# Patient Record
Sex: MALE | Race: WHITE | NOT HISPANIC OR LATINO | Employment: OTHER | URBAN - METROPOLITAN AREA
[De-identification: names, ages, dates, MRNs, and addresses within clinical notes are randomized per-mention and may not be internally consistent; named-entity substitution may affect disease eponyms.]

---

## 2019-02-10 PROBLEM — K92.2 GI BLEED: Status: ACTIVE | Noted: 2019-02-10

## 2019-02-11 ENCOUNTER — ANESTHESIA (OUTPATIENT)
Dept: ENDOSCOPY | Facility: HOSPITAL | Age: 79
DRG: 374 | End: 2019-02-11
Payer: MEDICARE

## 2019-02-11 ENCOUNTER — ANESTHESIA EVENT (OUTPATIENT)
Dept: ENDOSCOPY | Facility: HOSPITAL | Age: 79
DRG: 374 | End: 2019-02-11
Payer: MEDICARE

## 2019-02-11 ENCOUNTER — HOSPITAL ENCOUNTER (INPATIENT)
Facility: HOSPITAL | Age: 79
LOS: 3 days | Discharge: HOSPICE | DRG: 374 | End: 2019-02-14
Attending: INTERNAL MEDICINE | Admitting: INTERNAL MEDICINE
Payer: MEDICARE

## 2019-02-11 DIAGNOSIS — R00.0 TACHYCARDIA: ICD-10-CM

## 2019-02-11 DIAGNOSIS — I49.9 ABNORMAL HEART RHYTHM: ICD-10-CM

## 2019-02-11 DIAGNOSIS — I48.91 A-FIB: ICD-10-CM

## 2019-02-11 DIAGNOSIS — I25.10 CORONARY ARTERY DISEASE: ICD-10-CM

## 2019-02-11 DIAGNOSIS — R55 SYNCOPE: ICD-10-CM

## 2019-02-11 DIAGNOSIS — K92.2 GI BLEED: ICD-10-CM

## 2019-02-11 DIAGNOSIS — R00.1 BRADYCARDIA: ICD-10-CM

## 2019-02-11 DIAGNOSIS — E87.5 HYPERKALEMIA: ICD-10-CM

## 2019-02-11 DIAGNOSIS — I25.10 CORONARY ARTERY DISEASE, ANGINA PRESENCE UNSPECIFIED, UNSPECIFIED VESSEL OR LESION TYPE, UNSPECIFIED WHETHER NATIVE OR TRANSPLANTED HEART: Primary | ICD-10-CM

## 2019-02-11 PROBLEM — R94.31 QT PROLONGATION: Status: ACTIVE | Noted: 2019-02-11

## 2019-02-11 PROBLEM — M10.9 GOUT: Status: ACTIVE | Noted: 2019-02-11

## 2019-02-11 PROBLEM — I48.0 PAF (PAROXYSMAL ATRIAL FIBRILLATION): Status: ACTIVE | Noted: 2019-02-11

## 2019-02-11 PROBLEM — I10 ESSENTIAL HYPERTENSION: Status: ACTIVE | Noted: 2019-02-11

## 2019-02-11 PROBLEM — Z96.0 RETAINED URETHRAL STENT: Status: ACTIVE | Noted: 2019-02-11

## 2019-02-11 PROBLEM — R91.8 BILATERAL PULMONARY INFILTRATES ON CHEST X-RAY: Status: ACTIVE | Noted: 2019-02-11

## 2019-02-11 LAB
ABO + RH BLD: NORMAL
ALBUMIN SERPL BCP-MCNC: 2.6 G/DL
ALLENS TEST: ABNORMAL
ALP SERPL-CCNC: 98 U/L
ALT SERPL W/O P-5'-P-CCNC: 7 U/L
ANION GAP SERPL CALC-SCNC: 6 MMOL/L
APTT BLDCRRT: 22.7 SEC
ASCENDING AORTA: 3.08 CM
AST SERPL-CCNC: 13 U/L
AV INDEX (PROSTH): 0.56
AV MEAN GRADIENT: 3.18 MMHG
AV PEAK GRADIENT: 5.38 MMHG
AV VALVE AREA: 1.75 CM2
AV VELOCITY RATIO: 0.68
BASOPHILS # BLD AUTO: 0 K/UL
BASOPHILS # BLD AUTO: 0.01 K/UL
BASOPHILS # BLD AUTO: 0.01 K/UL
BASOPHILS # BLD AUTO: 0.02 K/UL
BASOPHILS NFR BLD: 0 %
BASOPHILS NFR BLD: 0.1 %
BASOPHILS NFR BLD: 0.1 %
BASOPHILS NFR BLD: 0.3 %
BILIRUB SERPL-MCNC: 0.7 MG/DL
BILIRUB UR QL STRIP: NEGATIVE
BLD GP AB SCN CELLS X3 SERPL QL: NORMAL
BNP SERPL-MCNC: 155 PG/ML
BSA FOR ECHO PROCEDURE: 1.69 M2
BUN SERPL-MCNC: 38 MG/DL
CALCIUM SERPL-MCNC: 8.7 MG/DL
CHLORIDE SERPL-SCNC: 108 MMOL/L
CLARITY UR REFRACT.AUTO: CLEAR
CO2 SERPL-SCNC: 22 MMOL/L
COLOR UR AUTO: YELLOW
CREAT SERPL-MCNC: 0.9 MG/DL
CV ECHO LV RWT: 0.39 CM
DELSYS: ABNORMAL
DIFFERENTIAL METHOD: ABNORMAL
DOP CALC AO PEAK VEL: 1.16 M/S
DOP CALC AO VTI: 26.67 CM
DOP CALC LVOT AREA: 3.11 CM2
DOP CALC LVOT DIAMETER: 1.99 CM
DOP CALC LVOT PEAK VEL: 0.78 M/S
DOP CALC LVOT STROKE VOLUME: 46.69 CM3
DOP CALCLVOT PEAK VEL VTI: 15.02 CM
E WAVE DECELERATION TIME: 297.66 MSEC
E/A RATIO: 1.23
E/E' RATIO: 14
ECHO LV POSTERIOR WALL: 0.83 CM (ref 0.6–1.1)
EOSINOPHIL # BLD AUTO: 0 K/UL
EOSINOPHIL # BLD AUTO: 0.1 K/UL
EOSINOPHIL NFR BLD: 0 %
EOSINOPHIL NFR BLD: 0.2 %
EOSINOPHIL NFR BLD: 0.6 %
EOSINOPHIL NFR BLD: 1.2 %
ERYTHROCYTE [DISTWIDTH] IN BLOOD BY AUTOMATED COUNT: 15.4 %
ERYTHROCYTE [DISTWIDTH] IN BLOOD BY AUTOMATED COUNT: 15.7 %
ERYTHROCYTE [DISTWIDTH] IN BLOOD BY AUTOMATED COUNT: 16 %
ERYTHROCYTE [DISTWIDTH] IN BLOOD BY AUTOMATED COUNT: 16.2 %
ERYTHROCYTE [SEDIMENTATION RATE] IN BLOOD BY WESTERGREN METHOD: 375 MM/H
EST. GFR  (AFRICAN AMERICAN): >60 ML/MIN/1.73 M^2
EST. GFR  (NON AFRICAN AMERICAN): >60 ML/MIN/1.73 M^2
ESTIMATED AVG GLUCOSE: 100 MG/DL
FIO2: 40
FRACTIONAL SHORTENING: 19 % (ref 28–44)
GLUCOSE SERPL-MCNC: 160 MG/DL
GLUCOSE UR QL STRIP: ABNORMAL
HBA1C MFR BLD HPLC: 5.1 %
HCO3 UR-SCNC: 25.2 MMOL/L (ref 24–28)
HCT VFR BLD AUTO: 24.2 %
HCT VFR BLD AUTO: 25.7 %
HCT VFR BLD AUTO: 25.8 %
HCT VFR BLD AUTO: 26.1 %
HGB BLD-MCNC: 7.6 G/DL
HGB BLD-MCNC: 8.3 G/DL
HGB BLD-MCNC: 8.4 G/DL
HGB BLD-MCNC: 8.5 G/DL
HGB UR QL STRIP: NEGATIVE
IMM GRANULOCYTES # BLD AUTO: 0.02 K/UL
IMM GRANULOCYTES # BLD AUTO: 0.04 K/UL
IMM GRANULOCYTES # BLD AUTO: 0.05 K/UL
IMM GRANULOCYTES # BLD AUTO: 0.05 K/UL
IMM GRANULOCYTES NFR BLD AUTO: 0.3 %
IMM GRANULOCYTES NFR BLD AUTO: 0.6 %
IMM GRANULOCYTES NFR BLD AUTO: 0.7 %
IMM GRANULOCYTES NFR BLD AUTO: 0.7 %
INR PPP: 1
INTERVENTRICULAR SEPTUM: 1.15 CM (ref 0.6–1.1)
KETONES UR QL STRIP: NEGATIVE
LA MAJOR: 6.79 CM
LA MINOR: 6.36 CM
LA WIDTH: 4.13 CM
LACTATE SERPL-SCNC: 1.2 MMOL/L
LEFT ATRIUM SIZE: 4.12 CM
LEFT ATRIUM VOLUME INDEX: 57.6 ML/M2
LEFT ATRIUM VOLUME: 94.99 CM3
LEFT INTERNAL DIMENSION IN SYSTOLE: 3.45 CM (ref 2.1–4)
LEFT VENTRICLE DIASTOLIC VOLUME INDEX: 49.54 ML/M2
LEFT VENTRICLE DIASTOLIC VOLUME: 81.72 ML
LEFT VENTRICLE MASS INDEX: 84.2 G/M2
LEFT VENTRICLE SYSTOLIC VOLUME INDEX: 29.9 ML/M2
LEFT VENTRICLE SYSTOLIC VOLUME: 49.26 ML
LEFT VENTRICULAR INTERNAL DIMENSION IN DIASTOLE: 4.27 CM (ref 3.5–6)
LEFT VENTRICULAR MASS: 138.95 G
LEUKOCYTE ESTERASE UR QL STRIP: NEGATIVE
LIPASE SERPL-CCNC: 9 U/L
LV LATERAL E/E' RATIO: 9.8
LV SEPTAL E/E' RATIO: 24.5
LYMPHOCYTES # BLD AUTO: 0.5 K/UL
LYMPHOCYTES # BLD AUTO: 0.6 K/UL
LYMPHOCYTES # BLD AUTO: 0.7 K/UL
LYMPHOCYTES # BLD AUTO: 0.8 K/UL
LYMPHOCYTES NFR BLD: 10.3 %
LYMPHOCYTES NFR BLD: 6.4 %
LYMPHOCYTES NFR BLD: 9.6 %
LYMPHOCYTES NFR BLD: 9.7 %
MAGNESIUM SERPL-MCNC: 1.7 MG/DL
MCH RBC QN AUTO: 28.6 PG
MCH RBC QN AUTO: 29.8 PG
MCH RBC QN AUTO: 29.9 PG
MCH RBC QN AUTO: 31.1 PG
MCHC RBC AUTO-ENTMCNC: 31.4 G/DL
MCHC RBC AUTO-ENTMCNC: 32.2 G/DL
MCHC RBC AUTO-ENTMCNC: 32.2 G/DL
MCHC RBC AUTO-ENTMCNC: 33.1 G/DL
MCV RBC AUTO: 91 FL
MCV RBC AUTO: 93 FL
MCV RBC AUTO: 93 FL
MCV RBC AUTO: 94 FL
MODE: ABNORMAL
MONOCYTES # BLD AUTO: 0.4 K/UL
MONOCYTES # BLD AUTO: 0.4 K/UL
MONOCYTES # BLD AUTO: 0.5 K/UL
MONOCYTES # BLD AUTO: 0.6 K/UL
MONOCYTES NFR BLD: 5.6 %
MONOCYTES NFR BLD: 6.8 %
MONOCYTES NFR BLD: 7.1 %
MONOCYTES NFR BLD: 7.9 %
MV PEAK A VEL: 0.8 M/S
MV PEAK E VEL: 0.98 M/S
NEUTROPHILS # BLD AUTO: 5 K/UL
NEUTROPHILS # BLD AUTO: 5.6 K/UL
NEUTROPHILS # BLD AUTO: 5.9 K/UL
NEUTROPHILS # BLD AUTO: 6.4 K/UL
NEUTROPHILS NFR BLD: 79.8 %
NEUTROPHILS NFR BLD: 82 %
NEUTROPHILS NFR BLD: 82.8 %
NEUTROPHILS NFR BLD: 87.2 %
NITRITE UR QL STRIP: NEGATIVE
NRBC BLD-RTO: 0 /100 WBC
PCO2 BLDA: 40.7 MMHG (ref 35–45)
PEEP: 5
PH SMN: 7.4 [PH] (ref 7.35–7.45)
PH UR STRIP: 5 [PH] (ref 5–8)
PHOSPHATE SERPL-MCNC: 2.3 MG/DL
PISA TR MAX VEL: 3.38 M/S
PLATELET # BLD AUTO: 143 K/UL
PLATELET # BLD AUTO: 148 K/UL
PLATELET # BLD AUTO: 155 K/UL
PLATELET # BLD AUTO: 165 K/UL
PMV BLD AUTO: 10.4 FL
PMV BLD AUTO: 10.5 FL
PMV BLD AUTO: 10.5 FL
PMV BLD AUTO: 10.9 FL
PO2 BLDA: 198 MMHG (ref 80–100)
POC BE: 0 MMOL/L
POC SATURATED O2: 100 % (ref 95–100)
POC TCO2: 26 MMOL/L (ref 23–27)
POCT GLUCOSE: 113 MG/DL (ref 70–110)
POCT GLUCOSE: 138 MG/DL (ref 70–110)
POCT GLUCOSE: 172 MG/DL (ref 70–110)
POTASSIUM SERPL-SCNC: 5.1 MMOL/L
PROCALCITONIN SERPL IA-MCNC: 0.14 NG/ML
PROT SERPL-MCNC: 5.6 G/DL
PROT UR QL STRIP: NEGATIVE
PROTHROMBIN TIME: 10.4 SEC
RA MAJOR: 5.05 CM
RA WIDTH: 3.88 CM
RBC # BLD AUTO: 2.66 M/UL
RBC # BLD AUTO: 2.73 M/UL
RBC # BLD AUTO: 2.78 M/UL
RBC # BLD AUTO: 2.82 M/UL
RIGHT VENTRICULAR END-DIASTOLIC DIMENSION: 3.85 CM
SAMPLE: ABNORMAL
SINUS: 3.03 CM
SITE: ABNORMAL
SODIUM SERPL-SCNC: 136 MMOL/L
SP GR UR STRIP: 1.02 (ref 1–1.03)
SP02: 100
STJ: 2.94 CM
TDI LATERAL: 0.1
TDI SEPTAL: 0.04
TDI: 0.07
TR MAX PG: 45.7 MMHG
TRICUSPID ANNULAR PLANE SYSTOLIC EXCURSION: 2.41 CM
TROPONIN I SERPL DL<=0.01 NG/ML-MCNC: 0.11 NG/ML
TROPONIN I SERPL DL<=0.01 NG/ML-MCNC: 0.14 NG/ML
TROPONIN I SERPL DL<=0.01 NG/ML-MCNC: 0.15 NG/ML
TSH SERPL DL<=0.005 MIU/L-ACNC: 0.42 UIU/ML
URATE SERPL-MCNC: 3 MG/DL
URN SPEC COLLECT METH UR: ABNORMAL
VT: 16
WBC # BLD AUTO: 6.05 K/UL
WBC # BLD AUTO: 6.77 K/UL
WBC # BLD AUTO: 7.35 K/UL
WBC # BLD AUTO: 7.38 K/UL

## 2019-02-11 PROCEDURE — 83036 HEMOGLOBIN GLYCOSYLATED A1C: CPT

## 2019-02-11 PROCEDURE — 88305 TISSUE SPECIMEN TO PATHOLOGY - SURGERY: ICD-10-PCS | Mod: 26,,, | Performed by: PATHOLOGY

## 2019-02-11 PROCEDURE — 36415 COLL VENOUS BLD VENIPUNCTURE: CPT

## 2019-02-11 PROCEDURE — 99223 1ST HOSP IP/OBS HIGH 75: CPT | Mod: 25,ICN,, | Performed by: INTERNAL MEDICINE

## 2019-02-11 PROCEDURE — 93010 ELECTROCARDIOGRAM REPORT: CPT | Mod: ,,, | Performed by: INTERNAL MEDICINE

## 2019-02-11 PROCEDURE — 63600175 PHARM REV CODE 636 W HCPCS: Performed by: STUDENT IN AN ORGANIZED HEALTH CARE EDUCATION/TRAINING PROGRAM

## 2019-02-11 PROCEDURE — 83690 ASSAY OF LIPASE: CPT

## 2019-02-11 PROCEDURE — 83605 ASSAY OF LACTIC ACID: CPT

## 2019-02-11 PROCEDURE — 85025 COMPLETE CBC W/AUTO DIFF WBC: CPT | Mod: 91

## 2019-02-11 PROCEDURE — 83735 ASSAY OF MAGNESIUM: CPT

## 2019-02-11 PROCEDURE — 84550 ASSAY OF BLOOD/URIC ACID: CPT

## 2019-02-11 PROCEDURE — 88305 TISSUE EXAM BY PATHOLOGIST: CPT | Mod: 26,,, | Performed by: PATHOLOGY

## 2019-02-11 PROCEDURE — 84100 ASSAY OF PHOSPHORUS: CPT

## 2019-02-11 PROCEDURE — 87040 BLOOD CULTURE FOR BACTERIA: CPT

## 2019-02-11 PROCEDURE — 93005 ELECTROCARDIOGRAM TRACING: CPT

## 2019-02-11 PROCEDURE — 81003 URINALYSIS AUTO W/O SCOPE: CPT

## 2019-02-11 PROCEDURE — 37000008 HC ANESTHESIA 1ST 15 MINUTES: Performed by: INTERNAL MEDICINE

## 2019-02-11 PROCEDURE — 20000000 HC ICU ROOM

## 2019-02-11 PROCEDURE — D9220A PRA ANESTHESIA: ICD-10-PCS | Mod: CRNA,,, | Performed by: NURSE ANESTHETIST, CERTIFIED REGISTERED

## 2019-02-11 PROCEDURE — 63600175 PHARM REV CODE 636 W HCPCS: Performed by: INTERNAL MEDICINE

## 2019-02-11 PROCEDURE — 43239 PR EGD, FLEX, W/BIOPSY, SGL/MULTI: ICD-10-PCS | Mod: GC,,, | Performed by: INTERNAL MEDICINE

## 2019-02-11 PROCEDURE — 43239 EGD BIOPSY SINGLE/MULTIPLE: CPT | Performed by: INTERNAL MEDICINE

## 2019-02-11 PROCEDURE — 99223 PR INITIAL HOSPITAL CARE,LEVL III: ICD-10-PCS | Mod: AI,GC,, | Performed by: INTERNAL MEDICINE

## 2019-02-11 PROCEDURE — 83880 ASSAY OF NATRIURETIC PEPTIDE: CPT

## 2019-02-11 PROCEDURE — D9220A PRA ANESTHESIA: Mod: CRNA,,, | Performed by: NURSE ANESTHETIST, CERTIFIED REGISTERED

## 2019-02-11 PROCEDURE — 94003 VENT MGMT INPAT SUBQ DAY: CPT

## 2019-02-11 PROCEDURE — D9220A PRA ANESTHESIA: ICD-10-PCS | Mod: ANES,,, | Performed by: ANESTHESIOLOGY

## 2019-02-11 PROCEDURE — 27200966 HC CLOSED SUCTION SYSTEM

## 2019-02-11 PROCEDURE — 27201012 HC FORCEPS, HOT/COLD, DISP: Performed by: INTERNAL MEDICINE

## 2019-02-11 PROCEDURE — 63600175 PHARM REV CODE 636 W HCPCS: Performed by: ANESTHESIOLOGY

## 2019-02-11 PROCEDURE — 86850 RBC ANTIBODY SCREEN: CPT

## 2019-02-11 PROCEDURE — 94761 N-INVAS EAR/PLS OXIMETRY MLT: CPT

## 2019-02-11 PROCEDURE — 37000009 HC ANESTHESIA EA ADD 15 MINS: Performed by: INTERNAL MEDICINE

## 2019-02-11 PROCEDURE — 84145 PROCALCITONIN (PCT): CPT

## 2019-02-11 PROCEDURE — 25000003 PHARM REV CODE 250: Performed by: INTERNAL MEDICINE

## 2019-02-11 PROCEDURE — 99900035 HC TECH TIME PER 15 MIN (STAT)

## 2019-02-11 PROCEDURE — 27000221 HC OXYGEN, UP TO 24 HOURS

## 2019-02-11 PROCEDURE — 25000003 PHARM REV CODE 250: Performed by: STUDENT IN AN ORGANIZED HEALTH CARE EDUCATION/TRAINING PROGRAM

## 2019-02-11 PROCEDURE — 93010 EKG 12-LEAD: ICD-10-PCS | Mod: 76,,, | Performed by: INTERNAL MEDICINE

## 2019-02-11 PROCEDURE — 94002 VENT MGMT INPAT INIT DAY: CPT

## 2019-02-11 PROCEDURE — 25000003 PHARM REV CODE 250: Performed by: ANESTHESIOLOGY

## 2019-02-11 PROCEDURE — 80053 COMPREHEN METABOLIC PANEL: CPT

## 2019-02-11 PROCEDURE — 84443 ASSAY THYROID STIM HORMONE: CPT

## 2019-02-11 PROCEDURE — 43239 EGD BIOPSY SINGLE/MULTIPLE: CPT | Mod: GC,,, | Performed by: INTERNAL MEDICINE

## 2019-02-11 PROCEDURE — 85730 THROMBOPLASTIN TIME PARTIAL: CPT

## 2019-02-11 PROCEDURE — 99223 PR INITIAL HOSPITAL CARE,LEVL III: ICD-10-PCS | Mod: 25,ICN,, | Performed by: INTERNAL MEDICINE

## 2019-02-11 PROCEDURE — C9113 INJ PANTOPRAZOLE SODIUM, VIA: HCPCS | Performed by: INTERNAL MEDICINE

## 2019-02-11 PROCEDURE — 99223 1ST HOSP IP/OBS HIGH 75: CPT | Mod: AI,GC,, | Performed by: INTERNAL MEDICINE

## 2019-02-11 PROCEDURE — 88305 TISSUE EXAM BY PATHOLOGIST: CPT | Performed by: PATHOLOGY

## 2019-02-11 PROCEDURE — D9220A PRA ANESTHESIA: Mod: ANES,,, | Performed by: ANESTHESIOLOGY

## 2019-02-11 PROCEDURE — 85610 PROTHROMBIN TIME: CPT

## 2019-02-11 PROCEDURE — 93010 ELECTROCARDIOGRAM REPORT: CPT | Mod: 76,,, | Performed by: INTERNAL MEDICINE

## 2019-02-11 PROCEDURE — 84484 ASSAY OF TROPONIN QUANT: CPT

## 2019-02-11 RX ORDER — METRONIDAZOLE 500 MG/100ML
500 INJECTION, SOLUTION INTRAVENOUS
Status: DISCONTINUED | OUTPATIENT
Start: 2019-02-11 | End: 2019-02-11

## 2019-02-11 RX ORDER — CEFEPIME HYDROCHLORIDE 2 G/1
2 INJECTION, POWDER, FOR SOLUTION INTRAVENOUS
Status: DISCONTINUED | OUTPATIENT
Start: 2019-02-11 | End: 2019-02-12

## 2019-02-11 RX ORDER — METOCLOPRAMIDE HYDROCHLORIDE 5 MG/ML
5 INJECTION INTRAMUSCULAR; INTRAVENOUS ONCE
Status: COMPLETED | OUTPATIENT
Start: 2019-02-11 | End: 2019-02-11

## 2019-02-11 RX ORDER — ONDANSETRON 8 MG/1
8 TABLET, ORALLY DISINTEGRATING ORAL EVERY 8 HOURS PRN
Status: DISCONTINUED | OUTPATIENT
Start: 2019-02-11 | End: 2019-02-11

## 2019-02-11 RX ORDER — SUCCINYLCHOLINE CHLORIDE 20 MG/ML
INJECTION INTRAMUSCULAR; INTRAVENOUS
Status: DISCONTINUED | OUTPATIENT
Start: 2019-02-11 | End: 2019-02-11

## 2019-02-11 RX ORDER — INSULIN ASPART 100 [IU]/ML
0-5 INJECTION, SOLUTION INTRAVENOUS; SUBCUTANEOUS EVERY 6 HOURS PRN
Status: DISCONTINUED | OUTPATIENT
Start: 2019-02-11 | End: 2019-02-14 | Stop reason: HOSPADM

## 2019-02-11 RX ORDER — ACETAMINOPHEN 325 MG/1
650 TABLET ORAL EVERY 4 HOURS PRN
Status: DISCONTINUED | OUTPATIENT
Start: 2019-02-11 | End: 2019-02-14 | Stop reason: HOSPADM

## 2019-02-11 RX ORDER — FENTANYL CITRATE 50 UG/ML
50 INJECTION, SOLUTION INTRAMUSCULAR; INTRAVENOUS
Status: DISCONTINUED | OUTPATIENT
Start: 2019-02-15 | End: 2019-02-12

## 2019-02-11 RX ORDER — POTASSIUM CHLORIDE 20 MEQ/15ML
40 SOLUTION ORAL
Status: DISCONTINUED | OUTPATIENT
Start: 2019-02-11 | End: 2019-02-11

## 2019-02-11 RX ORDER — PHENYLEPHRINE HYDROCHLORIDE 10 MG/ML
INJECTION INTRAVENOUS
Status: DISCONTINUED | OUTPATIENT
Start: 2019-02-11 | End: 2019-02-11

## 2019-02-11 RX ORDER — FENTANYL CITRATE 50 UG/ML
50 INJECTION, SOLUTION INTRAMUSCULAR; INTRAVENOUS
Status: DISCONTINUED | OUTPATIENT
Start: 2019-02-11 | End: 2019-02-12

## 2019-02-11 RX ORDER — SODIUM CHLORIDE 0.9 % (FLUSH) 0.9 %
3 SYRINGE (ML) INJECTION
Status: DISCONTINUED | OUTPATIENT
Start: 2019-02-11 | End: 2019-02-14 | Stop reason: HOSPADM

## 2019-02-11 RX ORDER — PROPOFOL 10 MG/ML
VIAL (ML) INTRAVENOUS
Status: DISCONTINUED | OUTPATIENT
Start: 2019-02-11 | End: 2019-02-11

## 2019-02-11 RX ORDER — MAGNESIUM SULFATE HEPTAHYDRATE 40 MG/ML
2 INJECTION, SOLUTION INTRAVENOUS
Status: DISCONTINUED | OUTPATIENT
Start: 2019-02-11 | End: 2019-02-11

## 2019-02-11 RX ORDER — GLUCAGON 1 MG
1 KIT INJECTION
Status: DISCONTINUED | OUTPATIENT
Start: 2019-02-11 | End: 2019-02-14 | Stop reason: HOSPADM

## 2019-02-11 RX ORDER — CEFEPIME HYDROCHLORIDE 2 G/1
2 INJECTION, POWDER, FOR SOLUTION INTRAVENOUS
Status: DISCONTINUED | OUTPATIENT
Start: 2019-02-11 | End: 2019-02-11

## 2019-02-11 RX ORDER — PANTOPRAZOLE SODIUM 40 MG/10ML
40 INJECTION, POWDER, LYOPHILIZED, FOR SOLUTION INTRAVENOUS 2 TIMES DAILY
Status: DISCONTINUED | OUTPATIENT
Start: 2019-02-11 | End: 2019-02-13

## 2019-02-11 RX ORDER — NOREPINEPHRINE BITARTRATE/D5W 4MG/250ML
0.02 PLASTIC BAG, INJECTION (ML) INTRAVENOUS CONTINUOUS
Status: DISCONTINUED | OUTPATIENT
Start: 2019-02-11 | End: 2019-02-12

## 2019-02-11 RX ORDER — PROMETHAZINE HYDROCHLORIDE 12.5 MG/1
12.5 TABLET ORAL EVERY 6 HOURS PRN
Status: DISCONTINUED | OUTPATIENT
Start: 2019-02-11 | End: 2019-02-14 | Stop reason: HOSPADM

## 2019-02-11 RX ORDER — EPHEDRINE SULFATE/0.9% NACL/PF 25 MG/5 ML
10 SYRINGE (ML) INTRAVENOUS ONCE
Status: DISCONTINUED | OUTPATIENT
Start: 2019-02-11 | End: 2019-02-11

## 2019-02-11 RX ORDER — EPINEPHRINE 1 MG/ML
INJECTION INTRAMUSCULAR; INTRAVENOUS; SUBCUTANEOUS
Status: DISCONTINUED
Start: 2019-02-11 | End: 2019-02-11 | Stop reason: WASHOUT

## 2019-02-11 RX ORDER — FENTANYL CITRATE 50 UG/ML
INJECTION, SOLUTION INTRAMUSCULAR; INTRAVENOUS
Status: DISCONTINUED | OUTPATIENT
Start: 2019-02-11 | End: 2019-02-11

## 2019-02-11 RX ORDER — PROPOFOL 10 MG/ML
5 INJECTION, EMULSION INTRAVENOUS CONTINUOUS
Status: DISCONTINUED | OUTPATIENT
Start: 2019-02-11 | End: 2019-02-12

## 2019-02-11 RX ORDER — ETOMIDATE 2 MG/ML
INJECTION INTRAVENOUS
Status: DISCONTINUED | OUTPATIENT
Start: 2019-02-11 | End: 2019-02-11

## 2019-02-11 RX ADMIN — CEFEPIME 2 G: 2 INJECTION, POWDER, FOR SOLUTION INTRAVENOUS at 09:02

## 2019-02-11 RX ADMIN — PROPOFOL 25 MG: 10 INJECTION, EMULSION INTRAVENOUS at 02:02

## 2019-02-11 RX ADMIN — PHENYLEPHRINE HYDROCHLORIDE 50 MCG: 10 INJECTION INTRAVENOUS at 02:02

## 2019-02-11 RX ADMIN — FENTANYL CITRATE 50 MCG: 50 INJECTION, SOLUTION INTRAMUSCULAR; INTRAVENOUS at 03:02

## 2019-02-11 RX ADMIN — PANTOPRAZOLE SODIUM 40 MG: 40 INJECTION, POWDER, FOR SOLUTION INTRAVENOUS at 09:02

## 2019-02-11 RX ADMIN — PANTOPRAZOLE SODIUM 40 MG: 40 INJECTION, POWDER, FOR SOLUTION INTRAVENOUS at 02:02

## 2019-02-11 RX ADMIN — Medication 0.02 MCG/KG/MIN: at 05:02

## 2019-02-11 RX ADMIN — PROPOFOL 5 MCG/KG/MIN: 10 INJECTION, EMULSION INTRAVENOUS at 03:02

## 2019-02-11 RX ADMIN — ETOMIDATE 20 MG: 2 INJECTION, SOLUTION INTRAVENOUS at 02:02

## 2019-02-11 RX ADMIN — PHENYLEPHRINE HYDROCHLORIDE 100 MCG: 10 INJECTION INTRAVENOUS at 02:02

## 2019-02-11 RX ADMIN — SUCCINYLCHOLINE CHLORIDE 100 MG: 20 INJECTION, SOLUTION INTRAMUSCULAR; INTRAVENOUS at 02:02

## 2019-02-11 RX ADMIN — PROPOFOL 50 MG: 10 INJECTION, EMULSION INTRAVENOUS at 02:02

## 2019-02-11 RX ADMIN — SODIUM CHLORIDE 500 ML: 0.9 INJECTION, SOLUTION INTRAVENOUS at 03:02

## 2019-02-11 RX ADMIN — ETOMIDATE 10 MG: 2 INJECTION, SOLUTION INTRAVENOUS at 02:02

## 2019-02-11 RX ADMIN — METOCLOPRAMIDE 5 MG: 5 INJECTION, SOLUTION INTRAMUSCULAR; INTRAVENOUS at 06:02

## 2019-02-11 RX ADMIN — MAGNESIUM SULFATE IN WATER 2 G: 40 INJECTION, SOLUTION INTRAVENOUS at 03:02

## 2019-02-11 RX ADMIN — FENTANYL CITRATE 25 MCG: 50 INJECTION, SOLUTION INTRAMUSCULAR; INTRAVENOUS at 02:02

## 2019-02-11 NOTE — ANESTHESIA POSTPROCEDURE EVALUATION
"Anesthesia Post Evaluation    Patient: Cash Rivera    Procedure(s) Performed: Procedure(s) (LRB):  EGD (ESOPHAGOGASTRODUODENOSCOPY) (N/A)    Final Anesthesia Type: general  Patient location during evaluation: PACU  Patient participation: No - Unable to Participate, Intubation  Level of consciousness: sedated  Post-procedure vital signs: reviewed and stable  Pain management: adequate  Airway patency: patent  PONV status at discharge: No PONV  Anesthetic complications: no      Cardiovascular status: hypotensive and hemodynamically stable  Respiratory status: intubated  Hydration status: euvolemic  Follow-up not needed.    Spoke with critical care team about patient needing additional sedation and some hemodynamic support as BP was 80s/50s when sign out was given.     Visit Vitals  /60 (BP Location: Left arm, Patient Position: Lying)   Pulse 63   Temp 36.6 °C (97.8 °F) (Oral)   Resp 19   Ht 5' 7" (1.702 m)   Wt 60.7 kg (133 lb 13.1 oz)   SpO2 99%   BMI 20.96 kg/m²       Pain/Jessica Score: No Data Recorded      "

## 2019-02-11 NOTE — PROGRESS NOTES
Pt has been stable for shift. No hematemesis or hematochezia EGD performed at bedside. Heart rate bradycardic. Pt left intubated to secure airway and complete Reglan therapy. Tissue biopsied. Propofol and Fentanyl ordered for sedation. Levophed initiated for MAP goal >65 per MD.

## 2019-02-11 NOTE — PLAN OF CARE
Problem: Adult Inpatient Plan of Care  Goal: Patient-Specific Goal (Individualization)          2/11:    Transferred from OSH for syncope, GI bleed. (2units PRBS, 1 unit FFP given at OSH)               GI consulted for possible EGD.               EGD performed. Large ulcer biopsied for possible carcinoma. Pt. Intubated and sedated for 24 hr Reglan therapy.              Propfol and Levo started.

## 2019-02-11 NOTE — HPI
78 year old male with a history of HTN, Goat, CAD s/p remote stent on who GI is being consulted for a suspected GI bleed in the setting of hematemesis with Hgb trending down.    History obtained from son and chart review.  Patient went to the [facility] on Saturday. He did not feel well as the event progress and eventually had a syncopal episode. He was then taken to an OSH where per family was started on IVF, Levaquin, and Prednisone. Yesterday team was working on possible D/C however in the afternoon son received a call stating that he had an episode of hematemesis. At that time patient had an NGT placed and was also started on a PPI. There is also mention of patient being on Levophed and Amiodarone.     Patient does have a history of indigestion with PPI as needed and currently using NSAID for epigastric pain (Advil/Alkaseltzer for abdominal pain).  No prior EGD or colonoscopy that the son knows about.    At [facility] patient is in the ICU but hemodynamically stable and not on pressors. He has not had further episodes of hematemesis or melena. Hemoglobin is down to ~7 from ~ 11 on 2/9/19.

## 2019-02-11 NOTE — SUBJECTIVE & OBJECTIVE
No past medical history on file.    No past surgical history on file.    Review of patient's allergies indicates:   Allergen Reactions    Penicillins        Family History     None        Tobacco Use    Smoking status: Not on file   Substance and Sexual Activity    Alcohol use: Not on file    Drug use: Not on file    Sexual activity: Not on file      Review of Systems   Unable to perform ROS: Dementia   Constitutional: Positive for fatigue. Negative for chills, diaphoresis and fever.   HENT: Negative for ear discharge.    Eyes: Negative for visual disturbance.   Respiratory: Positive for shortness of breath. Negative for cough, choking, chest tightness, wheezing and stridor.    Cardiovascular: Negative for chest pain, palpitations and leg swelling.   Gastrointestinal: Positive for abdominal pain, nausea and vomiting. Negative for abdominal distention, anal bleeding, blood in stool, constipation and diarrhea.   Endocrine: Negative for polydipsia, polyphagia and polyuria.   Genitourinary: Negative for difficulty urinating, genital sores, hematuria, penile swelling and urgency.   Musculoskeletal: Negative for arthralgias.   Skin: Positive for pallor. Negative for wound.   Allergic/Immunologic: Negative for food allergies and immunocompromised state.   Neurological: Positive for syncope and weakness. Negative for speech difficulty, numbness and headaches.   Psychiatric/Behavioral: Negative for agitation and behavioral problems.     Objective:     Vital Signs (Most Recent):  Pulse: (!) 56 (02/11/19 0145)  Resp: (!) 26 (02/11/19 0145)  BP: (!) 99/53 (02/11/19 0145)  SpO2: (!) 93 % (02/11/19 0145) Vital Signs (24h Range):  Pulse:  [] 56  Resp:  [23-26] 26  SpO2:  [93 %-97 %] 93 %  BP: ()/(43-68) 99/53      There is no height or weight on file to calculate BMI.    No intake or output data in the 24 hours ending 02/11/19 0243    Physical Exam   Constitutional: He is oriented to person, place, and time. No  distress.   Cachectic    HENT:   Mouth/Throat: No oropharyngeal exudate.   Edentulous, pale conjunctiva   Eyes: EOM are normal. Pupils are equal, round, and reactive to light. Right eye exhibits no discharge. Left eye exhibits no discharge. No scleral icterus.   Neck: Normal range of motion. No JVD present. No tracheal deviation present.   Cardiovascular: Regular rhythm, normal heart sounds and intact distal pulses. Exam reveals no gallop and no friction rub.   No murmur heard.  Sinus nica 52 bpm   Pulmonary/Chest: Effort normal. No respiratory distress. He has no wheezes. He has rales. He exhibits no tenderness.   Abdominal: Soft. Bowel sounds are normal. He exhibits no distension and no mass. There is tenderness. There is no rebound and no guarding.   Scaphoid abdomen   Musculoskeletal: Normal range of motion. 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 He exhibits no edema or tenderness.   Neurological: He is alert and oriented to person, place, and time. No cranial nerve deficit. Coordination normal.   Skin: Skin is warm. Capillary refill takes less than 2 seconds. No rash noted. He is not diaphoretic. No erythema. No pallor.   Psychiatric: He has a normal mood and affect.       Vents:     Lines/Drains/Airways          None        Significant Labs:    CBC/Anemia Profile:  Recent Labs   Lab 02/11/19  0145   WBC 7.38   HGB 8.4*   HCT 26.1*      MCV 93   RDW 15.4*        Chemistries:  No results for input(s): NA, K, CL, CO2, BUN, CREATININE, CALCIUM, ALBUMIN, PROT, BILITOT, ALKPHOS, ALT, AST, GLUCOSE, MG, PHOS in the last 48 hours.    CMP: No results for input(s): NA, K, CL, CO2, GLU, BUN, CREATININE, CALCIUM, PROT, ALBUMIN, BILITOT, ALKPHOS, AST, ALT, ANIONGAP, EGFRNONAA in the last 48 hours.    Invalid input(s): ESTGFAFRICA  Cardiac Markers: No results for input(s): CKMB, TROPONINT, MYOGLOBIN in the last 48 hours.  All pertinent labs within the past 24 hours have been reviewed.    Significant  Imaging: I have reviewed and interpreted all pertinent imaging results/findings within the past 24 hours.  Bibasilar interstitial airspace disease.

## 2019-02-11 NOTE — TRANSFER OF CARE
"Anesthesia Transfer of Care Note    Patient: Cash Rivera    Procedure(s) Performed: Procedure(s) (LRB):  EGD (ESOPHAGOGASTRODUODENOSCOPY) (N/A)    Patient location: ICU    Anesthesia Type: general    Post pain: adequate analgesia    Post assessment: no apparent anesthetic complications and tolerated procedure well    Post vital signs: stable    Level of consciousness: awake    Nausea/Vomiting: no nausea/vomiting    Complications: none    Transfer of care protocol was followedComments: Report given to ICU nurse. Pt to remain intubated in the ICU. CC team called by Dr Ruth.      Last vitals:   Visit Vitals  /60 (BP Location: Left arm, Patient Position: Lying)   Pulse 63   Temp 36.6 °C (97.8 °F) (Oral)   Resp 19   Ht 5' 7" (1.702 m)   Wt 60.7 kg (133 lb 13.1 oz)   SpO2 99%   BMI 20.96 kg/m²     "

## 2019-02-11 NOTE — ASSESSMENT & PLAN NOTE
-patient arrived on amiodarone gtt, will hold for now. In Sinus bradycardia on arrival. He was requiring amiodarone for Neosyn and Levophed- induced AF

## 2019-02-11 NOTE — ANESTHESIA PREPROCEDURE EVALUATION
02/11/2019  [facility]  Anesthesia Pre-Operative Evaluation         Patient Name: Cash Rivera  YOB: 1940  MRN: [id number]    SUBJECTIVE:     Pre-operative evaluation for Procedure(s) (LRB):  EGD (ESOPHAGOGASTRODUODENOSCOPY) (N/A)     02/11/2019    Cash Rivera is a 78 y.o. male w/ a significant PMHx of HTN, CAD s/p remote stent placement, pAFib, prolonged QT,  here with hematemesis and concerns for upper GIB. CXR with Diffuse abnormal interstitial alveolar pattern. Sats mid to upper 90s on RA.     Patient now presents for the above procedure(s).      LDA:       Peripheral IV - Single Lumen 02/11/19 0228 Left Antecubital (Active)   Site Assessment Clean;Dry;Intact;No redness;No swelling 2/11/2019  2:00 AM   Line Status Flushed;Saline locked 2/11/2019  2:00 AM   Dressing Status Clean;Dry;Intact 2/11/2019  2:00 AM   Number of days: 0            Peripheral IV - Single Lumen 02/11/19 0229 Left Forearm (Active)   Site Assessment Clean;Dry;Intact;No redness;No swelling 2/11/2019  2:00 AM   Line Status Flushed;Saline locked 2/11/2019  2:00 AM   Dressing Status Clean;Dry;Intact 2/11/2019  2:00 AM   Number of days: 0            NG/OG Tube 02/11/19 0235 (Active)   Placement Check placement verified by aspirate characteristics;placement verified by x-ray 2/11/2019  2:00 AM   Tolerance no signs/symptoms of discomfort 2/11/2019  2:00 AM   Securement secured to forehead w/ adhesive device 2/11/2019  2:00 AM   Clamp Status/Tolerance unclamped 2/11/2019  2:00 AM   Suction Setting/Drainage Method low;intermittent setting;suction at 2/11/2019  2:00 AM   Insertion Site Appearance no redness, warmth, tenderness, skin breakdown, drainage 2/11/2019  2:00 AM   Drainage Bloody 2/11/2019  2:00 AM   Tube Output(mL)(Include Discarded Residual) 10 mL 2/11/2019  6:00 AM   Number of days: 0        Prev airway: None documented.    Drips: None documented.      Patient Active Problem List   Diagnosis    GI bleed    CAD (coronary artery disease)    Gout    Syncope    Essential hypertension    Bilateral pulmonary infiltrates on chest x-ray    QT prolongation    PAF (paroxysmal atrial fibrillation)    Retained urethral stent       Review of patient's allergies indicates:   Allergen Reactions    Penicillins Hives       Current Inpatient Medications:   ceFEPime (MAXIPIME) IVPB  2 g Intravenous Q12H    pantoprazole  40 mg Intravenous BID       No current facility-administered medications on file prior to encounter.      No current outpatient medications on file prior to encounter.       Past Surgical History:   Procedure Laterality Date    ARM AMPUTATION AT ELBOW Right        Social History     Socioeconomic History    Marital status:      Spouse name: Not on file    Number of children: Not on file    Years of education: Not on file    Highest education level: Not on file   Social Needs    Financial resource strain: Not on file    Food insecurity - worry: Not on file    Food insecurity - inability: Not on file    Transportation needs - medical: Not on file    Transportation needs - non-medical: Not on file   Occupational History    Not on file   Tobacco Use    Smoking status: Not on file    Smokeless tobacco: Current User     Types: Chew   Substance and Sexual Activity    Alcohol use: No     Frequency: Never    Drug use: No    Sexual activity: Not Currently   Other Topics Concern    Not on file   Social History Narrative    Not on file       OBJECTIVE:     Vital Signs Range (Last 24H):  Temp:  [36.7 °C (98.1 °F)]   Pulse:  []   Resp:  [13-31]   BP: ()/(43-68)   SpO2:  [93 %-98 %]       Significant Labs:  Lab Results   Component Value Date    WBC 6.77 02/11/2019    HGB 8.3 (L) 02/11/2019    HCT 25.8 (L) 02/11/2019    MCV 93 02/11/2019     02/11/2019        Chemistry        Component Value Date/Time     02/11/2019 0145    K 5.1 02/11/2019 0145     02/11/2019 0145    CO2 22 (L) 02/11/2019 0145    BUN 38 (H) 02/11/2019 0145    CREATININE 0.9 02/11/2019 0145     (H) 02/11/2019 0145        Component Value Date/Time    CALCIUM 8.7 02/11/2019 0145    ALKPHOS 98 02/11/2019 0145    AST 13 02/11/2019 0145    ALT 7 (L) 02/11/2019 0145    BILITOT 0.7 02/11/2019 0145    ESTGFRAFRICA >60.0 02/11/2019 0145    EGFRNONAA >60.0 02/11/2019 0145        Lab Results   Component Value Date    INR 1.0 02/11/2019       Diagnostic Studies:       CXR   Impression       Diffuse abnormal interstitial alveolar pattern.  Bilateral pneumonitis, cardiogenic or noncardiogenic edema, sepsis, aspiration and ARDS all need to be considered.    NG tube near the EG junction.         EKG: No recent studies available. In process.     2D ECHO:  No results found for this or any previous visit.    TTE pending.       ASSESSMENT/PLAN:         Anesthesia Evaluation    I have reviewed the Patient Summary Reports.     I have reviewed the Medications.     Review of Systems  Anesthesia Hx:  Hx of Anesthetic complications  History of prior surgery of interest to airway management or planning: Denies Family Hx of Anesthesia complications.   Denies Personal Hx of Anesthesia complications.   Social:  Non-Smoker    Hematology/Oncology:     Oncology Normal    -- Anemia:   EENT/Dental:EENT/Dental Normal   Cardiovascular:   Hypertension CAD   Sinus bradycardia  Lateral infarct ,age undetermined  Abnormal ECG  No previous ECGs available   Pulmonary:  Pulmonary Normal    Renal/:  Renal/ Normal     Hepatic/GI:   GI bleed   Musculoskeletal:  Musculoskeletal Normal    Neurological:  Neurology Normal    Endocrine:  Endocrine Normal    Dermatological:  Skin Normal    Psych:  Psychiatric Normal           Physical Exam  General:  Well nourished    Airway/Jaw/Neck:  Airway Findings: Mouth Opening: Normal Tongue:  Normal  General Airway Assessment: Adult  Mallampati: II  Improves to II with phonation.  TM Distance: Normal, at least 6 cm  Jaw/Neck Findings:  Neck ROM: Normal ROM     Eyes/Ears/Nose:  Eyes/Ears/Nose Findings: (NG tube)    Dental:  Dental Findings: Edentulous   Chest/Lungs:  Chest/Lungs Findings: Clear to auscultation, Normal Respiratory Rate     Heart/Vascular:  Heart Findings: Rate: Normal      Musculoskeletal:  Musculoskeletal Findings: (s/p RUE amputation) Amputation     Mental Status:  Mental Status Findings: (Oriented to self)  Cooperative         Anesthesia Plan  Type of Anesthesia, risks & benefits discussed:  Anesthesia Type:  general  Patient's Preference:   Intra-op Monitoring Plan: standard ASA monitors  Intra-op Monitoring Plan Comments:   Post Op Pain Control Plan: multimodal analgesia  Post Op Pain Control Plan Comments:   Induction:   IV  Beta Blocker:  Patient is not currently on a Beta-Blocker (No further documentation required).       Informed Consent: Patient representative understands risks and agrees with Anesthesia plan.  Questions answered. Anesthesia consent signed with patient representative.  ASA Score: 3  emergent   Day of Surgery Review of History & Physical:    H&P update referred to the provider.         Ready For Surgery From Anesthesia Perspective.

## 2019-02-11 NOTE — ASSESSMENT & PLAN NOTE
Etiology symptomatic anemia vs cardiogenic - reviewed EKG- AF RVR noted at outside hospital. Rate control if in AF to keep rate < 115, keep K > 4, Mg >2, cardiac monitoring. Cannot tolerate anticoagulation at this time with GIB.

## 2019-02-11 NOTE — H&P
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 Patient reports that his initial symptoms included syncope and "seeing black" while he was using chewing tobacco on 2/9/18. The event was witnessed by a family member who noted about 1 minute of turning his head to the left and shaking all over" per ED reports. At that time he also reports mild chest discomfort to include substernal CP and dysnpea that have lasted subacutely for 3-4 weeks. His initial Hb was 11.4 on 2/9, and during his stay at [facility], he developed hematemesis, symptomatic anemia and had a possible aspiration event and 2 point drop in Hb. He was placed on nasogastric suction with coffee ground aspirate, and was given PPI therapy and transferred to [facility] for GI evaluation with Neosyn at the OSH, then Levophed and amiodarone infusing for AF RVR. Upon arrival the patient had stable vital signs, sinus bradycardia, MAP of 70. He reports taking intermittent NSAIDs and Judith Bono for stomach pain. He reports only taking ASA81 for CAD given remote revascularization history with 4 stents per charting. He was being treated for aspiration pneumonia vs bibasilar infiltrates with Levaquin at the time of transfer, and was scheduled for outside hospital discharge until hematemesis developed. He is a DNR code status.     Hospital/ICU Course:  No notes on file     No past medical history on " file.    No past surgical history on file.    Review of patient's allergies indicates:   Allergen Reactions    Penicillins        Family History     None        Tobacco Use    Smoking status: Not on file   Substance and Sexual Activity    Alcohol use: Not on file    Drug use: Not on file    Sexual activity: Not on file      Review of Systems   Unable to perform ROS: Dementia   Constitutional: Positive for fatigue. Negative for chills, diaphoresis and fever.   HENT: Negative for ear discharge.    Eyes: Negative for visual disturbance.   Respiratory: Positive for shortness of breath. Negative for cough, choking, chest tightness, wheezing and stridor.    Cardiovascular: Negative for chest pain, palpitations and leg swelling.   Gastrointestinal: Positive for abdominal pain, nausea and vomiting. Negative for abdominal distention, anal bleeding, blood in stool, constipation and diarrhea.   Endocrine: Negative for polydipsia, polyphagia and polyuria.   Genitourinary: Negative for difficulty urinating, genital sores, hematuria, penile swelling and urgency.   Musculoskeletal: Negative for arthralgias.   Skin: Positive for pallor. Negative for wound.   Allergic/Immunologic: Negative for food allergies and immunocompromised state.   Neurological: Positive for syncope and weakness. Negative for speech difficulty, numbness and headaches.   Psychiatric/Behavioral: Negative for agitation and behavioral problems.     Objective:     Vital Signs (Most Recent):  Pulse: (!) 56 (02/11/19 0145)  Resp: (!) 26 (02/11/19 0145)  BP: (!) 99/53 (02/11/19 0145)  SpO2: (!) 93 % (02/11/19 0145) Vital Signs (24h Range):  Pulse:  [] 56  Resp:  [23-26] 26  SpO2:  [93 %-97 %] 93 %  BP: ()/(43-68) 99/53      There is no height or weight on file to calculate BMI.    No intake or output data in the 24 hours ending 02/11/19 0243    Physical Exam   Constitutional: He is oriented to person, place, and time. No distress.   Cachectic     HENT:   Mouth/Throat: No oropharyngeal exudate.   Edentulous, pale conjunctiva   Eyes: EOM are normal. Pupils are equal, round, and reactive to light. Right eye exhibits no discharge. Left eye exhibits no discharge. No scleral icterus.   Neck: Normal range of motion. No JVD present. No tracheal deviation present.   Cardiovascular: Regular rhythm, normal heart sounds and intact distal pulses. Exam reveals no gallop and no friction rub.   No murmur heard.  Sinus nica 52 bpm   Pulmonary/Chest: Effort normal. No respiratory distress. He has no wheezes. He has rales. He exhibits no tenderness.   Abdominal: Soft. Bowel sounds are normal. He exhibits no distension and no mass. There is tenderness. There is no rebound and no guarding.   Scaphoid abdomen   Musculoskeletal: Normal range of motion. 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 He exhibits no edema or tenderness.   Neurological: He is alert and oriented to person, place, and time. No cranial nerve deficit. Coordination normal.   Skin: Skin is warm. Capillary refill takes less than 2 seconds. No rash noted. He is not diaphoretic. No erythema. No pallor.   Psychiatric: He has a normal mood and affect.       Vents:     Lines/Drains/Airways          None        Significant Labs:    CBC/Anemia Profile:  Recent Labs   Lab 02/11/19  0145   WBC 7.38   HGB 8.4*   HCT 26.1*      MCV 93   RDW 15.4*        Chemistries:  No results for input(s): NA, K, CL, CO2, BUN, CREATININE, CALCIUM, ALBUMIN, PROT, BILITOT, ALKPHOS, ALT, AST, GLUCOSE, MG, PHOS in the last 48 hours.    CMP: No results for input(s): NA, K, CL, CO2, GLU, BUN, CREATININE, CALCIUM, PROT, ALBUMIN, BILITOT, ALKPHOS, AST, ALT, ANIONGAP, EGFRNONAA in the last 48 hours.    Invalid input(s): ESTGFAFRICA  Cardiac Markers: No results for input(s): CKMB, TROPONINT, MYOGLOBIN in the last 48 hours.  All pertinent labs within the past 24 hours have been reviewed.    Significant Imaging: I have reviewed  and interpreted all pertinent imaging results/findings within the past 24 hours.  Bibasilar interstitial airspace disease.     Assessment/Plan:     Pulmonary   Bilateral pulmonary infiltrates on chest x-ray    -etiology possibly chronic parenchymal lung disease vs new aspiration event- cover with cefepime and Flagyl for now.      Cardiac/Vascular   PAF (paroxysmal atrial fibrillation)    -patient arrived on amiodarone gtt, will hold for now. In Sinus bradycardia on arrival. He was requiring amiodarone for Neosyn and Levophed- induced AF     Essential hypertension    -on atenolol at home, hold BB in GIB. Has required pressors- resume Levophed if needed for titration to MAP goal 65     CAD (coronary artery disease)    -remote history of stenting, on ASA81, atorvastatin, atenolol at home, SLNTG PRN. Hold home regimen for GI endoscopy, advance back to GDMT when stable.   -check EKG, troponin, no current angina concern for ACS. Avoid FQ antibiotics and Zofran given QTc  -will consult Cardiology as warranted for symptoms of ACS if present     QT prolongation    -avoid FQ antibiotics (received Levaquin prior to arrival ) and Zofran.      GI   * GI bleed    DDX PUD vs AVM vs variceal bleed (less likely).   -has received PPI load in [place], continue PPI 40mg IV BID, GI consulted, maintain 2 large bore PIVs, fluid support as tolerated per CXR and rales on exam.   -arrived with NGT in,. Trend CBC q6h for now.   NPO, anticipate EGD with GI. Has been lung on coverage for aspiration PNA vs parenchymal lung disease w/ Levaquin/steroids, here will continue with cefepime Flagyl, will also cover for variceal risk given little known history.      Orthopedic   Gout    -on allopurinol daily, resume when GIB stable.      Other   Syncope    Etiology symptomatic anemia vs cardiogenic - reviewed EKG- AF RVR noted at outside hospital. Rate control if in AF to keep rate < 115, keep K > 4, Mg >2, cardiac monitoring. Cannot tolerate  anticoagulation at this time with GIB.          Critical Care Daily Checklist:    A: Awake: RASS Goal/Actual Goal:    Actual:     B: Spontaneous Breathing Trial Performed?     C: SAT & SBT Coordinated?                       D: Delirium: CAM-ICU     E: Early Mobility Performed? no   F: Feeding Goal:    Status:     Current Diet Order   Procedures    Diet NPO Except for: Medication, Sips with Medication     Order Specific Question:   Except for     Answer:   Medication     Order Specific Question:   Except for     Answer:   Sips with Medication      AS: Analgesia/Sedation no   T: Thromboembolic Prophylaxis mech    H: HOB > 300 yes   U: Stress Ulcer Prophylaxis (if needed) PPI IV    G: Glucose Control 0-5SSI   B: Bowel Function     I: Indwelling Catheter (Lines & Rodarte) Necessity Condom cath   D: De-escalation of Antimicrobials/Pharmacotherapies Cefepime/Flagyl IV     Plan for the day/ETD EGD     Code Status:  Family/Goals of Care: DNR         Critical secondary to Patient has a condition that poses threat to life and bodily function: acute upper GI bleed     Critical care was time spent personally by me on the following activities: development of treatment plan with patient or surrogate and bedside caregivers, discussions with consultants, evaluation of patient's response to treatment, examination of patient, ordering and performing treatments and interventions, ordering and review of laboratory studies, ordering and review of radiographic studies, pulse oximetry, re-evaluation of patient's condition. This critical care time did not overlap with that of any other provider or involve time for any procedures.     Galo Byers MD  Critical Care Medicine  [facility]

## 2019-02-11 NOTE — ASSESSMENT & PLAN NOTE
DDX PUD vs AVM vs varix (less likely).   -has received PPI load in [place], continue PPI 40mg IV BID, GI consulted, maintain 2 large bore PIVs, fluid support as tolerated per CXR and rales on exam.   -arrived with NGT in, continue LIMWS. Trend CBC q6h for now.   NPO, anticipate EGD with GI. Has been lung coverage for aspiration PNA with cefepime Flagyl, will cover for variceal risk given little known history.

## 2019-02-11 NOTE — PLAN OF CARE
Problem: Adult Inpatient Plan of Care  Goal: Patient-Specific Goal (Individualization)  2/11/19 SICU admit from [facility]  HX: cad, htn, stents x 4, asprin, gout, urethral stents  Dx: GI bleed, syncope   2/9 ed admit [facility] for syncopal episode   2/11 icu upgrade from the floor after syncopal episode w/bloody emisis, possible aspiration, 2pt drop in h/h, then transferred to SICU for higher level of care and egd    Nursing   NGT- LIWS  MAP >65  CBC, troponin, accu checks q6

## 2019-02-11 NOTE — HPI
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 Patient reports that his initial symptoms included syncope and "seeing black" while he was using chewing tobacco on 2/9/18. The event was witnessed by a family member who noted about 1 minute of turning his head to the left and shaking all over" per ED reports. At that time he also reports mild chest discomfort to include substernal CP and dysnpea that have lasted subacutely for 3-4 weeks. His initial Hb was 11.4 on 2/9, and during his stay at [facility], he developed hematemesis, symptomatic anemia and had a possible aspiration event and 2 point drop in Hb. He was placed on nasogastric suction with coffee ground aspirate, and was given PPI therapy and [place] [facility] [place] with Neosyn at the OSH, then Levophed and amiodarone infusing for AF RVR. Upon arrival the patient had stable vital signs, sinus bradycardia, MAP of 70. He reports taking intermittent NSAIDs and [employer] [place] for stomach pain. He reports only taking ASA81 for CAD given remote revascularization history with 4 stents per charting. He was being treated for aspiration pneumonia vs bibasilar infiltrates with Levaquin at the time of transfer, and was scheduled for outside hospital discharge until hematemesis developed. He is a DNR code status.   "

## 2019-02-11 NOTE — CONSULTS
[facility]  Gastroenterology  Consult Note    Patient Name: Cash Rivera  MRN: [id number]  Admission Date: 2/11/2019  Hospital Length of Stay: 0 days  Code Status: DNR   Attending Provider: Jacinto Adames MD   Consulting Provider: Valdemar Castellanos MD  Primary Care Physician: Primary Doctor No  Principal Problem:GI bleed    Inpatient consult to Gastroenterology  Consult performed by: Valdemar Castellanos MD  Consult ordered by: Galo Byers MD        Subjective:     HPI:  78 year old male with a history of HTN, Goat, CAD s/p remote stent on who GI is being consulted for a suspected GI bleed in the setting of hematemesis with Hgb trending down.    History obtained from son and chart review.  Patient went to the [facility] on Saturday. He did not feel well as the event progress and eventually had a syncopal episode. He was then taken to an OSH where per family was started on IVF, Levaquin, and Prednisone. Yesterday team was working on possible D/C however in the afternoon son received a call stating that he had an episode of hematemesis. At that time patient had an NGT placed and was also started on a PPI. There is also mention of patient being on Levophed and Amiodarone.     Patient does have a history of indigestion with PPI as needed and currently using NSAID for epigastric pain (Advil/Alkaseltzer for abdominal pain).  No prior EGD or colonoscopy that the son knows about.    [place] [facility] [place] is in the ICU but hemodynamically stable and not on pressors. He has not had further episodes of hematemesis or melena. Hemoglobin is down to ~7 from ~ 11 on 2/9/19.              Past Medical History:   Diagnosis Date    Coronary artery disease     Hypertension        Past Surgical History:   Procedure Laterality Date    ARM AMPUTATION AT ELBOW Right        Review of patient's allergies indicates:   Allergen Reactions    Penicillins Hives     Family History     None        Tobacco Use     Smoking status: Not on file    Smokeless tobacco: Current User     Types: Chew   Substance and Sexual Activity    Alcohol use: No     Frequency: Never    Drug use: No    Sexual activity: Not Currently     Review of Systems   Unable to perform ROS: Dementia     Objective:     Vital Signs (Most Recent):  Temp: 98.1 °F (36.7 °C) (02/11/19 0300)  Pulse: 60 (02/11/19 0645)  Resp: 14 (02/11/19 0645)  BP: (!) 106/51 (02/11/19 0645)  SpO2: (!) 94 % (02/11/19 0645) Vital Signs (24h Range):  Temp:  [98.1 °F (36.7 °C)] 98.1 °F (36.7 °C)  Pulse:  [] 60  Resp:  [13-31] 14  SpO2:  [93 %-98 %] 94 %  BP: ()/(43-68) 106/51        There is no height or weight on file to calculate BMI.      Intake/Output Summary (Last 24 hours) at 2/11/2019 0922  Last data filed at 2/11/2019 0700  Gross per 24 hour   Intake 500 ml   Output 1125 ml   Net -625 ml       Lines/Drains/Airways     Drain                 NG/OG Tube 02/11/19 0235 less than 1 day          Pressure Ulcer                 Pressure Injury 02/11/19 0230 Sacral spine Stage 1 less than 1 day          Peripheral Intravenous Line                 Peripheral IV - Single Lumen 02/11/19 0228 Left Antecubital less than 1 day         Peripheral IV - Single Lumen 02/11/19 0229 Left Forearm less than 1 day                Physical Exam   Constitutional: No distress.   HENT:   Head: Normocephalic and atraumatic.   Eyes: No scleral icterus.   Cardiovascular: Normal rate and regular rhythm.   Pulmonary/Chest: Effort normal and breath sounds normal.   Abdominal: Soft. Bowel sounds are normal.   NG tube in place with no output   Musculoskeletal: He exhibits no edema.   Neurological:   Awake and oriented to self   Skin: He is not diaphoretic.       Significant Labs:  CBC:   Recent Labs   Lab 02/11/19 0145 02/11/19 0642   WBC 7.38 6.05   HGB 8.4* 7.6*   HCT 26.1* 24.2*    143*     CMP:   Recent Labs   Lab 02/11/19 0145   *   CALCIUM 8.7   ALBUMIN 2.6*   PROT 5.6*       K 5.1   CO2 22*      BUN 38*   CREATININE 0.9   ALKPHOS 98   ALT 7*   AST 13   BILITOT 0.7     Coagulation:   Recent Labs   Lab 02/11/19  0145   INR 1.0   APTT 22.7       Significant Imaging:  Imaging results within the past 24 hours have been reviewed.    Assessment/Plan:     * GI bleed    78 year old male with a history of HTN, Goat, CAD s/p remote stent on who GI is being consulted for a suspected GI bleed in the setting of hematemesis with Hgb trending down. In the setting of hematemesis with Hgb trending down as well as reports of indigestion with NSAID use will proceed with EGD to rule out esophagitis vs gastritis vs PUD.    Recommendations:  -NPO  -Maintain IV access with 2 large bore IVs  -Intravascular resuscitation/support with IVFs   -Serial H/H's and pRBCs transfusion as indicated  -Protonix 40 mg IV BID  -Discontinue all NSAIDs and Heparin products  -Please correct any coagulopathy with platelets and FFP to a goal of platelets >50K and INR <2.0  -Plan for EGD today  -Please promptly notify GI team if there is significant change in patient's clinical status           Thank you for your consult. I will follow-up with patient. Please contact us if you have any additional questions.    Valdemar Castellanos M.D.  Gastroenterology Fellow, PGY-V  Pager: [phone]  [facility]

## 2019-02-11 NOTE — ASSESSMENT & PLAN NOTE
-remote history of stenting, on ASA81, atorvastatin, atenolol at home, SLNTG PRN. Hold home regimen for GI endoscopy, [facility].   -check EKG, troponin, no current angina concern for ACS. Avoid FQ antibiotics and Zofran given QTc

## 2019-02-11 NOTE — [employer] PATIENT INSTRUCTIONS
Discharge Summary/Instructions after an Endoscopic Procedure  Patient Name: Cash Rivera  Patient MRN: [id number]  Patient YOB: 1940 Monday, February 11, 2019  Khari Rudd MD  RESTRICTIONS:  During your procedure today, you received medications for sedation.  These   medications may affect your judgment, balance and coordination.  Therefore,   for 24 hours, you have the following restrictions:   - DO NOT drive a car, operate machinery, make legal/financial decisions,   sign important papers or drink alcohol.    ACTIVITY:  Today: no heavy lifting, straining or running due to procedural   sedation/anesthesia.  The following day: return to full activity including work.  DIET:  Eat and drink normally unless instructed otherwise.     TREATMENT FOR COMMON SIDE EFFECTS:  - Mild abdominal pain, nausea, belching, bloating or excessive gas:  rest,   eat lightly and use a heating pad.  - Sore Throat: treat with throat lozenges and/or gargle with warm salt   water.  - Because air was used during the procedure, expelling large amounts of air   from your rectum or belching is normal.  - If a bowel prep was taken, you may not have a bowel movement for 1-3 days.    This is normal.  SYMPTOMS TO WATCH FOR AND REPORT TO YOUR PHYSICIAN:  1. Abdominal pain or bloating, other than gas cramps.  2. Chest pain.  3. Back pain.  4. Signs of infection such as: chills or fever occurring within 24 hours   after the procedure.  5. Rectal bleeding, which would show as bright red, maroon, or black stools.   (A tablespoon of blood from the rectum is not serious, especially if   hemorrhoids are present.)  6. Vomiting.  7. Weakness or dizziness.  GO DIRECTLY TO THE NEAREST EMERGENCY ROOM IF YOU HAVE ANY OF THE FOLLOWING:      Difficulty breathing              Chills and/or fever over 101 F   Persistent vomiting and/or vomiting blood   Severe abdominal pain   Severe chest pain   Black, tarry stools   Bleeding- more than one  tablespoon   Any other symptom or condition that you feel may need urgent attention  Your doctor recommends these additional instructions:  If any biopsies were taken, your doctors clinic will contact you in 1 to 2   weeks with any results.  - Return patient to ICU for ongoing care.   - Use a proton pump inhibitor PO BID for 3 months.   - Await pathology results.   - Patient to remain intubated x 24 hours given significant amount of blood   clot remaining in stomach. Use reglan to assist in clearing stomach, then   can consider extubation.  For questions, problems or results please call your physician - Khari Rudd MD at Work:  [phone].  [facility], EMERGENCY ROOM PHONE NUMBER: [phone]  IF A COMPLICATION OR EMERGENCY SITUATION ARISES AND YOU ARE UNABLE TO REACH   YOUR PHYSICIAN - GO DIRECTLY TO THE EMERGENCY ROOM.  Khari Rudd MD  2/11/2019 3:55:09 PM  This report has been verified and signed electronically.  [employer]

## 2019-02-11 NOTE — ASSESSMENT & PLAN NOTE
-on atenolol at home, hold BB in GIB. Has required pressors- resume Levophed if needed for titration to MAP goal 65

## 2019-02-11 NOTE — ASSESSMENT & PLAN NOTE
78 year old male with a history of HTN, Goat, CAD s/p remote stent on who GI is being consulted for a suspected GI bleed in the setting of hematemesis with Hgb trending down. In the setting of hematemesis with Hgb trending down as well as reports of indigestion with NSAID use will proceed with EGD to rule out esophagitis vs gastritis vs PUD.    Recommendations:  -NPO  -Maintain IV access with 2 large bore IVs  -Intravascular resuscitation/support with IVFs   -Serial H/H's and pRBCs transfusion as indicated  -Protonix 40 mg IV BID  -Discontinue all NSAIDs and Heparin products  -Please correct any coagulopathy with platelets and FFP to a goal of platelets >50K and INR <2.0  -Plan for EGD today  -Please promptly notify GI team if there is significant change in patient's clinical status

## 2019-02-11 NOTE — CARE UPDATE
GI discussed EGD findings with MICU, multiple blood clots with concern for mass, no active bleeding, biopsy taken.  Will keep intubated overnight per GI request.  Patient is hypotensive post procedure, will start levophed and obtain stat CBC.  Will start intermittent fentanyl pushes with propofol for sedation given plans for possible extubation tomorrow.      Vincenzo Bullard M.D.   PGY-3  Pager [phone]

## 2019-02-11 NOTE — ASSESSMENT & PLAN NOTE
-etiology possibly chronic parenchymal lung disease vs new aspiration event- cover with cefepime and Flagyl for now.

## 2019-02-11 NOTE — SUBJECTIVE & OBJECTIVE
Past Medical History:   Diagnosis Date    Coronary artery disease     Hypertension        Past Surgical History:   Procedure Laterality Date    ARM AMPUTATION AT ELBOW Right        Review of patient's allergies indicates:   Allergen Reactions    Penicillins Hives     Family History     None        Tobacco Use    Smoking status: Not on file    Smokeless tobacco: Current User     Types: Chew   Substance and Sexual Activity    Alcohol use: No     Frequency: Never    Drug use: No    Sexual activity: Not Currently     Review of Systems   Unable to perform ROS: Dementia     Objective:     Vital Signs (Most Recent):  Temp: 98.1 °F (36.7 °C) (02/11/19 0300)  Pulse: 60 (02/11/19 0645)  Resp: 14 (02/11/19 0645)  BP: (!) 106/51 (02/11/19 0645)  SpO2: (!) 94 % (02/11/19 0645) Vital Signs (24h Range):  Temp:  [98.1 °F (36.7 °C)] 98.1 °F (36.7 °C)  Pulse:  [] 60  Resp:  [13-31] 14  SpO2:  [93 %-98 %] 94 %  BP: ()/(43-68) 106/51        There is no height or weight on file to calculate BMI.      Intake/Output Summary (Last 24 hours) at 2/11/2019 0922  Last data filed at 2/11/2019 0700  Gross per 24 hour   Intake 500 ml   Output 1125 ml   Net -625 ml       Lines/Drains/Airways     Drain                 NG/OG Tube 02/11/19 0235 less than 1 day          Pressure Ulcer                 Pressure Injury 02/11/19 0230 Sacral spine Stage 1 less than 1 day          Peripheral Intravenous Line                 Peripheral IV - Single Lumen 02/11/19 0228 Left Antecubital less than 1 day         Peripheral IV - Single Lumen 02/11/19 0229 Left Forearm less than 1 day                Physical Exam   Constitutional: No distress.   HENT:   Head: Normocephalic and atraumatic.   Eyes: No scleral icterus.   Cardiovascular: Normal rate and regular rhythm.   Pulmonary/Chest: Effort normal and breath sounds normal.   Abdominal: Soft. Bowel sounds are normal.   NG tube in place with no output   Musculoskeletal: He exhibits no edema.    Neurological:   Awake and oriented to self   Skin: He is not diaphoretic.       Significant Labs:  CBC:   Recent Labs   Lab 02/11/19 0145 02/11/19  0642   WBC 7.38 6.05   HGB 8.4* 7.6*   HCT 26.1* 24.2*    143*     CMP:   Recent Labs   Lab 02/11/19 0145   *   CALCIUM 8.7   ALBUMIN 2.6*   PROT 5.6*      K 5.1   CO2 22*      BUN 38*   CREATININE 0.9   ALKPHOS 98   ALT 7*   AST 13   BILITOT 0.7     Coagulation:   Recent Labs   Lab 02/11/19 0145   INR 1.0   APTT 22.7       Significant Imaging:  Imaging results within the past 24 hours have been reviewed.

## 2019-02-12 PROBLEM — I50.43 ACUTE ON CHRONIC COMBINED SYSTOLIC AND DIASTOLIC HEART FAILURE: Status: ACTIVE | Noted: 2019-02-12

## 2019-02-12 LAB
ANION GAP SERPL CALC-SCNC: 7 MMOL/L
BASOPHILS # BLD AUTO: 0.02 K/UL
BASOPHILS # BLD AUTO: 0.04 K/UL
BASOPHILS NFR BLD: 0.3 %
BASOPHILS NFR BLD: 0.5 %
BNP SERPL-MCNC: 232 PG/ML
BUN SERPL-MCNC: 32 MG/DL
CALCIUM SERPL-MCNC: 8.6 MG/DL
CHLORIDE SERPL-SCNC: 107 MMOL/L
CO2 SERPL-SCNC: 25 MMOL/L
CREAT SERPL-MCNC: 0.9 MG/DL
DIFFERENTIAL METHOD: ABNORMAL
DIFFERENTIAL METHOD: ABNORMAL
EOSINOPHIL # BLD AUTO: 0.3 K/UL
EOSINOPHIL # BLD AUTO: 0.5 K/UL
EOSINOPHIL NFR BLD: 4 %
EOSINOPHIL NFR BLD: 5.7 %
ERYTHROCYTE [DISTWIDTH] IN BLOOD BY AUTOMATED COUNT: 15.9 %
ERYTHROCYTE [DISTWIDTH] IN BLOOD BY AUTOMATED COUNT: 16.3 %
EST. GFR  (AFRICAN AMERICAN): >60 ML/MIN/1.73 M^2
EST. GFR  (NON AFRICAN AMERICAN): >60 ML/MIN/1.73 M^2
GLUCOSE SERPL-MCNC: 99 MG/DL
HCT VFR BLD AUTO: 26.5 %
HCT VFR BLD AUTO: 28.6 %
HGB BLD-MCNC: 8.2 G/DL
HGB BLD-MCNC: 9.2 G/DL
IMM GRANULOCYTES # BLD AUTO: 0.03 K/UL
IMM GRANULOCYTES # BLD AUTO: 0.05 K/UL
IMM GRANULOCYTES NFR BLD AUTO: 0.4 %
IMM GRANULOCYTES NFR BLD AUTO: 0.6 %
INR PPP: 1
LYMPHOCYTES # BLD AUTO: 0.9 K/UL
LYMPHOCYTES # BLD AUTO: 1.5 K/UL
LYMPHOCYTES NFR BLD: 12.4 %
LYMPHOCYTES NFR BLD: 17.2 %
MAGNESIUM SERPL-MCNC: 2.2 MG/DL
MCH RBC QN AUTO: 28.9 PG
MCH RBC QN AUTO: 29.9 PG
MCHC RBC AUTO-ENTMCNC: 30.9 G/DL
MCHC RBC AUTO-ENTMCNC: 32.2 G/DL
MCV RBC AUTO: 93 FL
MCV RBC AUTO: 93 FL
MONOCYTES # BLD AUTO: 0.6 K/UL
MONOCYTES # BLD AUTO: 0.7 K/UL
MONOCYTES NFR BLD: 8 %
MONOCYTES NFR BLD: 9.1 %
NEUTROPHILS # BLD AUTO: 5.1 K/UL
NEUTROPHILS # BLD AUTO: 5.7 K/UL
NEUTROPHILS NFR BLD: 68 %
NEUTROPHILS NFR BLD: 73.8 %
NRBC BLD-RTO: 0 /100 WBC
NRBC BLD-RTO: 0 /100 WBC
PHOSPHATE SERPL-MCNC: 1.7 MG/DL
PLATELET # BLD AUTO: 160 K/UL
PLATELET # BLD AUTO: 186 K/UL
PMV BLD AUTO: 10.3 FL
PMV BLD AUTO: 10.5 FL
POCT GLUCOSE: 105 MG/DL (ref 70–110)
POCT GLUCOSE: 107 MG/DL (ref 70–110)
POCT GLUCOSE: 110 MG/DL (ref 70–110)
POCT GLUCOSE: 97 MG/DL (ref 70–110)
POTASSIUM SERPL-SCNC: 3.9 MMOL/L
POTASSIUM SERPL-SCNC: 3.9 MMOL/L
PROTHROMBIN TIME: 10 SEC
RBC # BLD AUTO: 2.84 M/UL
RBC # BLD AUTO: 3.08 M/UL
SODIUM SERPL-SCNC: 139 MMOL/L
WBC # BLD AUTO: 6.92 K/UL
WBC # BLD AUTO: 8.42 K/UL

## 2019-02-12 PROCEDURE — 94003 VENT MGMT INPAT SUBQ DAY: CPT

## 2019-02-12 PROCEDURE — 93005 ELECTROCARDIOGRAM TRACING: CPT

## 2019-02-12 PROCEDURE — 20000000 HC ICU ROOM

## 2019-02-12 PROCEDURE — 94010 BREATHING CAPACITY TEST: CPT

## 2019-02-12 PROCEDURE — 99900035 HC TECH TIME PER 15 MIN (STAT)

## 2019-02-12 PROCEDURE — 80048 BASIC METABOLIC PNL TOTAL CA: CPT

## 2019-02-12 PROCEDURE — 99291 CRITICAL CARE FIRST HOUR: CPT | Mod: ,,, | Performed by: NURSE PRACTITIONER

## 2019-02-12 PROCEDURE — 63600175 PHARM REV CODE 636 W HCPCS: Performed by: INTERNAL MEDICINE

## 2019-02-12 PROCEDURE — 99900017 HC EXTUBATION W/PARAMETERS (STAT)

## 2019-02-12 PROCEDURE — 85025 COMPLETE CBC W/AUTO DIFF WBC: CPT

## 2019-02-12 PROCEDURE — 84100 ASSAY OF PHOSPHORUS: CPT

## 2019-02-12 PROCEDURE — 94761 N-INVAS EAR/PLS OXIMETRY MLT: CPT

## 2019-02-12 PROCEDURE — 93010 EKG 12-LEAD: ICD-10-PCS | Mod: ,,, | Performed by: INTERNAL MEDICINE

## 2019-02-12 PROCEDURE — C9113 INJ PANTOPRAZOLE SODIUM, VIA: HCPCS | Performed by: INTERNAL MEDICINE

## 2019-02-12 PROCEDURE — 25000003 PHARM REV CODE 250: Performed by: STUDENT IN AN ORGANIZED HEALTH CARE EDUCATION/TRAINING PROGRAM

## 2019-02-12 PROCEDURE — 99291 PR CRITICAL CARE, E/M 30-74 MINUTES: ICD-10-PCS | Mod: ,,, | Performed by: NURSE PRACTITIONER

## 2019-02-12 PROCEDURE — 99900026 HC AIRWAY MAINTENANCE (STAT)

## 2019-02-12 PROCEDURE — 27000221 HC OXYGEN, UP TO 24 HOURS

## 2019-02-12 PROCEDURE — 27200966 HC CLOSED SUCTION SYSTEM

## 2019-02-12 PROCEDURE — 63600175 PHARM REV CODE 636 W HCPCS: Performed by: NURSE PRACTITIONER

## 2019-02-12 PROCEDURE — 83880 ASSAY OF NATRIURETIC PEPTIDE: CPT

## 2019-02-12 PROCEDURE — 85610 PROTHROMBIN TIME: CPT

## 2019-02-12 PROCEDURE — 93010 ELECTROCARDIOGRAM REPORT: CPT | Mod: ,,, | Performed by: INTERNAL MEDICINE

## 2019-02-12 PROCEDURE — 83735 ASSAY OF MAGNESIUM: CPT

## 2019-02-12 PROCEDURE — 94150 VITAL CAPACITY TEST: CPT

## 2019-02-12 PROCEDURE — 63600175 PHARM REV CODE 636 W HCPCS: Performed by: STUDENT IN AN ORGANIZED HEALTH CARE EDUCATION/TRAINING PROGRAM

## 2019-02-12 RX ORDER — FUROSEMIDE 10 MG/ML
40 INJECTION INTRAMUSCULAR; INTRAVENOUS ONCE
Status: COMPLETED | OUTPATIENT
Start: 2019-02-12 | End: 2019-02-12

## 2019-02-12 RX ORDER — METOCLOPRAMIDE HYDROCHLORIDE 5 MG/ML
5 INJECTION INTRAMUSCULAR; INTRAVENOUS ONCE
Status: COMPLETED | OUTPATIENT
Start: 2019-02-12 | End: 2019-02-12

## 2019-02-12 RX ADMIN — METOCLOPRAMIDE 5 MG: 5 INJECTION, SOLUTION INTRAMUSCULAR; INTRAVENOUS at 09:02

## 2019-02-12 RX ADMIN — FUROSEMIDE 40 MG: 10 INJECTION, SOLUTION INTRAVENOUS at 12:02

## 2019-02-12 RX ADMIN — SODIUM GLYCOLATE 15 MMOL: 216 INJECTION, SOLUTION INTRAVENOUS at 06:02

## 2019-02-12 RX ADMIN — CEFEPIME 2 G: 2 INJECTION, POWDER, FOR SOLUTION INTRAVENOUS at 09:02

## 2019-02-12 RX ADMIN — PANTOPRAZOLE SODIUM 40 MG: 40 INJECTION, POWDER, FOR SOLUTION INTRAVENOUS at 08:02

## 2019-02-12 RX ADMIN — PANTOPRAZOLE SODIUM 40 MG: 40 INJECTION, POWDER, FOR SOLUTION INTRAVENOUS at 09:02

## 2019-02-12 RX ADMIN — PROPOFOL 25 MCG/KG/MIN: 10 INJECTION, EMULSION INTRAVENOUS at 12:02

## 2019-02-12 NOTE — ASSESSMENT & PLAN NOTE
--Etiology symptomatic anemia vs cardiogenic   --paroxysmal afib with RVR noted at outside hospital and in am 02/12  --Echo showed EF 35% and stage II diastolic dysfunction   --Cannot tolerate anticoagulation at this time with GIB.

## 2019-02-12 NOTE — PLAN OF CARE
Problem: Adult Inpatient Plan of Care  Goal: Plan of Care Review  Outcome: Ongoing (interventions implemented as appropriate)  Patient intermittently alert, following commands, and moving all extremities.  Remains on AC VC 30% FiO2 and 5 PEEP w/ SpO2 sustaining >95%. Levo gtt titrated to maintain MAP's >65.  Propofol gtt titrated for pt comfort and to maintain RASS score goal. UO to external male catheter: + mL/hr and clear/yellow.  Stage I to sacral area unchanged and presently covered with dry/intact foam dressing. Preventative foam dressings placed to bilateral heels and SCD's maintained to bilateral lower extremities.  MD notified with recent lab results/need for electrolyte replacement; currently awaiting arrival to unit from pharmacy.  Patient turned and repositioned, left upper extremity restraint adjusted and assessed, and passive ROM exercises performed q2hrs/PRN. Patient has been updated on plan of care.  Will continue to monitor closely.

## 2019-02-12 NOTE — TREATMENT PLAN
Treatment Plan  02/11/2019  6:00 PM    EGD completed with impression and recommendations below.    Impression:             - Normal esophagus.  - Large hiatal hernia.  - Non-obstructing non-bleeding gastric ulcer with a flat pigmented spot (Willi Class IIc). Biopsied. This is suspicious for malignancy.  - Clotted blood in the gastric body.  - No gross lesions in the entire examined duodenum.    Recommendation:         - Return patient to ICU for ongoing care.  - Use a proton pump inhibitor PO BID for 3 months.  - Await pathology results.  - Patient to remain intubated x 24 hours given significant amount of blood clot remaining in stomach. Use reglan to assist in clearing stomach, then can consider extubation.  - We will continue to follow alongside primary team, please call with any additional questions or concerns.     Valdemar Castellanos M.D.  Gastroenterology Fellow, PGY-V  Pager: [phone]  [facility]

## 2019-02-12 NOTE — RESIDENT HANDOFF
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 Patient reports that his initial symptoms included syncope and "seeing black" while he was using chewing tobacco on 2/9/18. The event was witnessed by a family member who noted about 1 minute of turning his head to the left and shaking all over" per ED reports. At that time he also reports mild chest discomfort to include substernal CP and dysnpea that have lasted subacutely for 3-4 weeks. His initial Hb was 11.4 on 2/9, and during his stay at [facility], he developed hematemesis, symptomatic anemia and had a possible aspiration event and 2 point drop in Hb. He was placed on nasogastric suction with coffee ground aspirate, and was given PPI therapy and transferred to [facility] for GI evaluation with Neosyn at the OSH, then Levophed and amiodarone infusing for AF RVR. Upon arrival the patient had stable vital signs, sinus bradycardia, MAP of 70. He reports taking intermittent NSAIDs and Judith Clinton for stomach pain. He reports only taking ASA81 for CAD given remote revascularization history with 4 stents per charting. He was being treated for aspiration pneumonia vs bibasilar infiltrates with Levaquin at the time of transfer, and was scheduled for outside hospital discharge until hematemesis developed. He is a DNR code status.     Hospital/ICU Course:  Following admission to MICU on 02/11, an EGD was conducted by GI who found multiple blood clots with concern for mass, no active bleeding, biopsy taken.  Will keep intubated overnight per GI request. Hypotension noted post-procedure, so " levophed initiated and stat CBC which showed no decrease in hgb.  Will start intermittent fentanyl pushes with propofol for sedation given plans for possible extubation tomorrow. He was extubated on the morning of 02/12, to room air with prn nasal cannula. Antibiotics discontinued as low indications for pna, lasix ordered to remove excess fluid. Will plan to discuss the findings of his EGD and what Mr. Yanez would like to pursue if mass is malignant.         To Follow Up:     GI Bleed: EGD on 2/11 biopsy of mass (possible adenocarcinoma); no further bleeding, CBC q12  Paroxysmal afib RVR: no treatment required as inpatient  Heart failure: EF 35% consider PO Lasix  PNA: unlikely given hospital course, abx d/c'd\  HTN: holding home meds given normotension; restart for increased BP       Discharge Plan:     Home with family; possible chemo depending on biopsy results    Call with questions.

## 2019-02-12 NOTE — HOSPITAL COURSE
Following admission to MICU on 02/11, an EGD was conducted by GI who found multiple blood clots with concern for mass, no active bleeding, biopsy taken.  Will keep intubated overnight per GI request. Hypotension noted post-procedure, so levophed initiated and stat CBC which showed no decrease in hgb.  Will start intermittent fentanyl pushes with propofol for sedation given plans for possible extubation tomorrow. He was extubated on the morning of 02/12, to room air with prn nasal cannula. Antibiotics discontinued as low indications for pna, lasix ordered to remove excess fluid. Met with patient and his daughter and granddaughter on 2/13 to discuss goals of care. Mr. Rivera has made it his wish that no further investigation of his EGD results beyond the biopsy which is still pending. Plans for discharge with home hospice have been arranged for his discharge on 2/14.

## 2019-02-12 NOTE — PROGRESS NOTES
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 Patient reports that his initial symptoms included syncope and "seeing black" while he was using chewing tobacco on 2/9/18. The event was witnessed by a family member who noted about 1 minute of turning his head to the left and shaking all over" per ED reports. At that time he also reports mild chest discomfort to include substernal CP and dysnpea that have lasted subacutely for 3-4 weeks. His initial Hb was 11.4 on 2/9, and during his stay at [facility], he developed hematemesis, symptomatic anemia and had a possible aspiration event and 2 point drop in Hb. He was placed on nasogastric suction with coffee ground aspirate, and was given PPI therapy and transferred to [facility] for GI evaluation with Neosyn at the OSH, then Levophed and amiodarone infusing for AF RVR. Upon arrival the patient had stable vital signs, sinus bradycardia, MAP of 70. He reports taking intermittent NSAIDs and Judith Mariposa for stomach pain. He reports only taking ASA81 for CAD given remote revascularization history with 4 stents per charting. He was being treated for aspiration pneumonia vs bibasilar infiltrates with Levaquin at the time of transfer, and was scheduled for outside hospital discharge until hematemesis developed. He is a DNR code status.     Hospital/ICU Course:  Following admission to MICU on 02/11, an EGD was conducted by ESTRELLA" who found multiple blood clots with concern for mass, no active bleeding, biopsy taken.  Will keep intubated overnight per GI request. Hypotension noted post-procedure, so levophed initiated and stat CBC which showed no decrease in hgb.  Will start intermittent fentanyl pushes with propofol for sedation given plans for possible extubation tomorrow. He was extubated on the morning of 02/12, to room air with prn nasal cannula. Antibiotics discontinued as low indications for pna, lasix ordered to remove excess fluid. Will plan to discuss the findings of his EGD and what Mr. Yanez would like to pursue if mass is malignant.       Interval History/Significant Events: no acute events overnight, remained intubated per GI    Review of Systems   Unable to perform ROS: Intubated     Objective:     Vital Signs (Most Recent):  Temp: 98.7 °F (37.1 °C) (02/12/19 0700)  Pulse: 79 (02/12/19 0849)  Resp: (!) 29 (02/12/19 0849)  BP: (!) 101/55 (02/12/19 0815)  SpO2: 98 % (02/12/19 0849) Vital Signs (24h Range):  Temp:  [97.7 °F (36.5 °C)-98.7 °F (37.1 °C)] 98.7 °F (37.1 °C)  Pulse:  [] 79  Resp:  [16-35] 29  SpO2:  [97 %-100 %] 98 %  BP: ()/(48-63) 101/55   Weight: 56.7 kg (125 lb 0 oz)  Body mass index is 19.58 kg/m².      Intake/Output Summary (Last 24 hours) at 2/12/2019 1329  Last data filed at 2/12/2019 0930  Gross per 24 hour   Intake 974.1 ml   Output 2122 ml   Net -1147.9 ml       Physical Exam   Constitutional: He appears well-developed. He does not have a sickly appearance. No distress. He is intubated.   HENT:   Head: Normocephalic and atraumatic.   Eyes: Conjunctivae are normal. Pupils are equal, round, and reactive to light. Right eye exhibits no exudate. Left eye exhibits no exudate. Right conjunctiva has no hemorrhage. Left conjunctiva has no hemorrhage. No scleral icterus. Right eye exhibits no nystagmus. Left eye exhibits no nystagmus.   Neck: Trachea normal. No neck rigidity. No tracheal deviation  present.   Cardiovascular: Normal rate, regular rhythm and normal heart sounds. PMI is not displaced. Exam reveals no gallop and no friction rub.   No murmur heard.  Pulses:       Radial pulses are 2+ on the right side, and 2+ on the left side.        Dorsalis pedis pulses are 2+ on the right side, and 2+ on the left side.   Pulmonary/Chest: Effort normal and breath sounds normal. No accessory muscle usage. He is intubated. No respiratory distress. He has no wheezes. He has no rhonchi. He has no rales.   Abdominal: Soft. Normal appearance and bowel sounds are normal. There is no tenderness.   Musculoskeletal: Normal range of motion.   Neurological: He is alert. No cranial nerve deficit or sensory deficit. GCS eye subscore is 4. GCS verbal subscore is 1. GCS motor subscore is 6.   Alert and following commands, no neuro deficits appreciated.    Skin: Skin is warm and dry. Capillary refill takes 2 to 3 seconds. He is not diaphoretic. No cyanosis. Nails show no clubbing.   Nursing note and vitals reviewed.      Vents:  Vent Mode: Spont (02/12/19 0843)  Ventilator Initiated: Yes (02/11/19 1439)  Set Rate: 16 bmp (02/12/19 0528)  Vt Set: 375 mL (02/12/19 0528)  Pressure Support: 0 cmH20 (02/12/19 0843)  PEEP/CPAP: 5 cmH20 (02/12/19 0843)  Oxygen Concentration (%): 100 (02/12/19 0845)  Peak Airway Pressure: 7.1 cmH2O (02/12/19 0843)  Plateau Pressure: 0 cmH20 (02/12/19 0843)  Total Ve: 12.3 mL (02/12/19 0843)  Negative Inspiratory Force (cm H2O): -36 (02/12/19 0843)  F/VT Ratio<105 (RSBI): 113.45 (02/12/19 0843)  Lines/Drains/Airways     Drain            Male External Urinary Catheter 02/11/19 0800 Medium 1 day          Pressure Ulcer                 Pressure Injury 02/11/19 0230 Sacral spine Stage 1 1 day          Peripheral Intravenous Line                 Peripheral IV - Single Lumen 02/11/19 0228 Left Antecubital 1 day              Significant Labs:    CBC/Anemia Profile:  Recent Labs   Lab 02/11/19  1200  02/11/19  1523 02/12/19  0330   WBC 6.77 7.35 8.42   HGB 8.3* 8.5* 8.2*   HCT 25.8* 25.7* 26.5*    148* 186   MCV 93 94 93   RDW 16.0* 16.2* 16.3*        Chemistries:  Recent Labs   Lab 02/11/19  0145 02/12/19  0330 02/12/19  0846     --  139   K 5.1  --  3.9  3.9     --  107   CO2 22*  --  25   BUN 38*  --  32*   CREATININE 0.9  --  0.9   CALCIUM 8.7  --  8.6*   ALBUMIN 2.6*  --   --    PROT 5.6*  --   --    BILITOT 0.7  --   --    ALKPHOS 98  --   --    ALT 7*  --   --    AST 13  --   --    MG 1.7 2.2  --    PHOS 2.3* 1.7*  --        All pertinent labs within the past 24 hours have been reviewed.    Significant Imaging:  I have reviewed all pertinent imaging results/findings within the past 24 hours.      ABG  Recent Labs   Lab 02/11/19  1737   PH 7.399   PO2 198*   PCO2 40.7   HCO3 25.2   BE 0     Assessment/Plan:     Pulmonary   Bilateral pulmonary infiltrates on chest x-ray    -etiology possibly chronic parenchymal lung disease, less likely new aspiration event  --d/c abx     Cardiac/Vascular   Acute on chronic combined systolic and diastolic heart failure    --reduced EF 35%  --diurese with lasix for excess fluid removal     PAF (paroxysmal atrial fibrillation)    --patient arrived on amiodarone gtt, will hold for now.  --Sinus bradycardia noted on arrival.   --currently in NSR     QT prolongation    -avoid FQ antibiotics (received Levaquin prior to arrival ) and Zofran at this time.      Essential hypertension    --holding home htn medicartions  --shock resolved, off pressors  --normotensive at this time     CAD (coronary artery disease)    --remote history of stenting, on ASA81, atorvastatin, atenolol at home, SLNTG PRN.   --on hold following EGD   --no current angina concern for ACS. Avoid FQ antibiotics and Zofran given QTc       Renal/   Retained urethral stent    --continue condom catheter for now  --if hayes placement indicated, please call urology     GI   * GI bleed    --EGD with  GI showed source of bleeding is a mass (possible adenocarcinoma).   --continue PPI 40mg IV BID,   --CBC q12        Orthopedic   Gout    -on allopurinol daily, resume when GIB stable.      Other   Syncope    --Etiology symptomatic anemia vs cardiogenic   --paroxysmal afib with RVR noted at outside hospital and in am 02/12  --Echo showed EF 35% and stage II diastolic dysfunction   --Cannot tolerate anticoagulation at this time with GIB.            Critical Care Time: 45 minutes  Critical secondary to Patient has a condition that poses threat to life and bodily function: GI bleed requiring intubation and mechanical ventilation      Critical care was time spent personally by me on the following activities: development of treatment plan with patient or surrogate and bedside caregivers, discussions with consultants, evaluation of patient's response to treatment, examination of patient, ordering and performing treatments and interventions, ordering and review of laboratory studies, ordering and review of radiographic studies, pulse oximetry, re-evaluation of patient's condition. This critical care time did not overlap with that of any other provider or involve time for any procedures.     Jose M Snow NP  Critical Care Medicine  [facility]

## 2019-02-12 NOTE — ASSESSMENT & PLAN NOTE
--patient arrived on amiodarone gtt, will hold for now.  --Sinus bradycardia noted on arrival.   --currently in NSR

## 2019-02-12 NOTE — PT/OT/SLP PROGRESS
Physical Therapy      Patient Name:  Cash Rivera   MRN:  [id number]    Patient not seen today secondary to (pt on hold 2nd to being vented. ). Will follow-up at a later date..    Sarah Lopez, PT   2/12/2019

## 2019-02-12 NOTE — TREATMENT PLAN
Treatment Plan  02/12/2019  11:21 AM    Patient s/p EGD yesterday with large ulcer which is likely malignant (path in process). The next step in treatment depends on the wished of the patient and family. He is DNR but if they want to purse aggressive management patient needs a CT C/A/P as well as consults with Heme-Onc and Surgical Onc after CT is resulted.    We thank you for this consultation, we will sign off at this time, please call with any additional questions.    Valdemar Castellanos M.D.  Gastroenterology Fellow, PGY-V  Pager: [phone]  [facility]

## 2019-02-12 NOTE — ASSESSMENT & PLAN NOTE
--EGD with GI showed source of bleeding is a mass (possible adenocarcinoma).   --continue PPI 40mg IV BID,   --CBC q12

## 2019-02-12 NOTE — SUBJECTIVE & OBJECTIVE
Interval History/Significant Events: no acute events overnight, remained intubated per GI    Review of Systems   Unable to perform ROS: Intubated     Objective:     Vital Signs (Most Recent):  Temp: 98.7 °F (37.1 °C) (02/12/19 0700)  Pulse: 79 (02/12/19 0849)  Resp: (!) 29 (02/12/19 0849)  BP: (!) 101/55 (02/12/19 0815)  SpO2: 98 % (02/12/19 0849) Vital Signs (24h Range):  Temp:  [97.7 °F (36.5 °C)-98.7 °F (37.1 °C)] 98.7 °F (37.1 °C)  Pulse:  [] 79  Resp:  [16-35] 29  SpO2:  [97 %-100 %] 98 %  BP: ()/(48-63) 101/55   Weight: 56.7 kg (125 lb 0 oz)  Body mass index is 19.58 kg/m².      Intake/Output Summary (Last 24 hours) at 2/12/2019 1329  Last data filed at 2/12/2019 0930  Gross per 24 hour   Intake 974.1 ml   Output 2122 ml   Net -1147.9 ml       Physical Exam   Constitutional: He appears well-developed. He does not have a sickly appearance. No distress. He is intubated.   HENT:   Head: Normocephalic and atraumatic.   Eyes: Conjunctivae are normal. Pupils are equal, round, and reactive to light. Right eye exhibits no exudate. Left eye exhibits no exudate. Right conjunctiva has no hemorrhage. Left conjunctiva has no hemorrhage. No scleral icterus. Right eye exhibits no nystagmus. Left eye exhibits no nystagmus.   Neck: Trachea normal. No neck rigidity. No tracheal deviation present.   Cardiovascular: Normal rate, regular rhythm and normal heart sounds. PMI is not displaced. Exam reveals no gallop and no friction rub.   No murmur heard.  Pulses:       Radial pulses are 2+ on the right side, and 2+ on the left side.        Dorsalis pedis pulses are 2+ on the right side, and 2+ on the left side.   Pulmonary/Chest: Effort normal and breath sounds normal. No accessory muscle usage. He is intubated. No respiratory distress. He has no wheezes. He has no rhonchi. He has no rales.   Abdominal: Soft. Normal appearance and bowel sounds are normal. There is no tenderness.   Musculoskeletal: Normal range of motion.    Neurological: He is alert. No cranial nerve deficit or sensory deficit. GCS eye subscore is 4. GCS verbal subscore is 1. GCS motor subscore is 6.   Alert and following commands, no neuro deficits appreciated.    Skin: Skin is warm and dry. Capillary refill takes 2 to 3 seconds. He is not diaphoretic. No cyanosis. Nails show no clubbing.   Nursing note and vitals reviewed.      Vents:  Vent Mode: Spont (02/12/19 0843)  Ventilator Initiated: Yes (02/11/19 1439)  Set Rate: 16 bmp (02/12/19 0528)  Vt Set: 375 mL (02/12/19 0528)  Pressure Support: 0 cmH20 (02/12/19 0843)  PEEP/CPAP: 5 cmH20 (02/12/19 0843)  Oxygen Concentration (%): 100 (02/12/19 0845)  Peak Airway Pressure: 7.1 cmH2O (02/12/19 0843)  Plateau Pressure: 0 cmH20 (02/12/19 0843)  Total Ve: 12.3 mL (02/12/19 0843)  Negative Inspiratory Force (cm H2O): -36 (02/12/19 0843)  F/VT Ratio<105 (RSBI): 113.45 (02/12/19 0843)  Lines/Drains/Airways     Drain            Male External Urinary Catheter 02/11/19 0800 Medium 1 day          Pressure Ulcer                 Pressure Injury 02/11/19 0230 Sacral spine Stage 1 1 day          Peripheral Intravenous Line                 Peripheral IV - Single Lumen 02/11/19 0228 Left Antecubital 1 day              Significant Labs:    CBC/Anemia Profile:  Recent Labs   Lab 02/11/19  1200 02/11/19  1523 02/12/19  0330   WBC 6.77 7.35 8.42   HGB 8.3* 8.5* 8.2*   HCT 25.8* 25.7* 26.5*    148* 186   MCV 93 94 93   RDW 16.0* 16.2* 16.3*        Chemistries:  Recent Labs   Lab 02/11/19  0145 02/12/19  0330 02/12/19  0846     --  139   K 5.1  --  3.9  3.9     --  107   CO2 22*  --  25   BUN 38*  --  32*   CREATININE 0.9  --  0.9   CALCIUM 8.7  --  8.6*   ALBUMIN 2.6*  --   --    PROT 5.6*  --   --    BILITOT 0.7  --   --    ALKPHOS 98  --   --    ALT 7*  --   --    AST 13  --   --    MG 1.7 2.2  --    PHOS 2.3* 1.7*  --        All pertinent labs within the past 24 hours have been reviewed.    Significant Imaging:  I  have reviewed all pertinent imaging results/findings within the past 24 hours.

## 2019-02-12 NOTE — ASSESSMENT & PLAN NOTE
--remote history of stenting, on ASA81, atorvastatin, atenolol at home, SLNTG PRN.   --on hold following EGD   --no current angina concern for ACS. Avoid FQ antibiotics and Zofran given QTc

## 2019-02-12 NOTE — PROGRESS NOTES
Pt extubated to 2L nasal cannula with no adverse reactions. Parameters complete and in the flowsheet. Will continue to monitor.

## 2019-02-12 NOTE — PROGRESS NOTES
Pt extubated. Three tarry stools throughout the day. Levophed titrated off with sedation. H&H stable. VSS. is awake and orientated. Will continue to monitor. Please review flowsheet data for full assessment details.

## 2019-02-12 NOTE — PROGRESS NOTES
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 Patient reports that his initial symptoms included syncope and "seeing black" while he was using chewing tobacco on 2/9/18. The event was witnessed by a family member who noted about 1 minute of turning his head to the left and shaking all over" per ED reports. At that time he also reports mild chest discomfort to include substernal CP and dysnpea that have lasted subacutely for 3-4 weeks. His initial Hb was 11.4 on 2/9, and during his stay at [facility], he developed hematemesis, symptomatic anemia and had a possible aspiration event and 2 point drop in Hb. He was placed on nasogastric suction with coffee ground aspirate, and was given PPI therapy and transferred to [facility] for GI evaluation with Neosyn at the OSH, then Levophed and amiodarone infusing for AF RVR. Upon arrival the patient had stable vital signs, sinus bradycardia, MAP of 70. He reports taking intermittent NSAIDs and Judith [place] for stomach pain. He reports only taking ASA81 for CAD given remote revascularization history with 4 stents per charting. He was being treated for aspiration pneumonia vs bibasilar infiltrates with Levaquin at the time of transfer, and was scheduled for outside hospital discharge until hematemesis developed. He is a DNR code status.     Hospital/ICU Course:  Following admission to MICU on 02/11, an EGD was conducted by ESTRELLA" who found multiple blood clots with concern for mass, no active bleeding, biopsy taken.  Will keep intubated overnight per GI request. Hypotension noted post-procedure, so levophed initiated and stat CBC which showed no decrease in hgb.  Will start intermittent fentanyl pushes with propofol for sedation given plans for possible extubation tomorrow. He was extubated on the morning of 02/12, to room air with prn nasal cannula. Antibiotics discontinued as low indications for pna, lasix ordered to remove excess fluid. Will plan to discuss the findings of his EGD and what Mr. Yanez would like to pursue if mass is malignant.       No new subjective & objective note has been filed under this hospital service since the last note was generated.      ABG  Recent Labs   Lab 02/11/19  1737   PH 7.399   PO2 198*   PCO2 40.7   HCO3 25.2   BE 0     Assessment/Plan:     Pulmonary   Bilateral pulmonary infiltrates on chest x-ray    -etiology possibly chronic parenchymal lung disease, less likely new aspiration event  --d/c abx     Cardiac/Vascular   Acute on chronic combined systolic and diastolic heart failure    --reduced EF 35%  --diurese with lasix for excess fluid removal     PAF (paroxysmal atrial fibrillation)    --patient arrived on amiodarone gtt, will hold for now.  --Sinus bradycardia noted on arrival.   --currently in NSR     QT prolongation    -avoid FQ antibiotics (received Levaquin prior to arrival ) and Zofran at this time.      Essential hypertension    --holding home htn medicartions  --shock resolved, off pressors  --normotensive at this time     CAD (coronary artery disease)    --remote history of stenting, on ASA81, atorvastatin, atenolol at home, SLNTG PRN.   --on hold following EGD   --no current angina concern for ACS. Avoid FQ antibiotics and Zofran given QTc       Renal/   Retained urethral stent    --continue condom catheter for now  --if hayes placement indicated, please call urology     GI   * GI  bleed    --EGD with GI showed source of bleeding is a mass (possible adenocarcinoma).   --continue PPI 40mg IV BID,   --CBC q12        Orthopedic   Gout    -on allopurinol daily, resume when GIB stable.      Other   Syncope    --Etiology symptomatic anemia vs cardiogenic   --paroxysmal afib with RVR noted at outside hospital and in am 02/12  --Echo showed EF 35% and stage II diastolic dysfunction   --Cannot tolerate anticoagulation at this time with GIB.            Critical Care Time: 45 minutes  Critical secondary to Patient has a condition that poses threat to life and bodily function: GI bleed requiring intubation and mechanical ventilation      Critical care was time spent personally by me on the following activities: development of treatment plan with patient or surrogate and bedside caregivers, discussions with consultants, evaluation of patient's response to treatment, examination of patient, ordering and performing treatments and interventions, ordering and review of laboratory studies, ordering and review of radiographic studies, pulse oximetry, re-evaluation of patient's condition. This critical care time did not overlap with that of any other provider or involve time for any procedures.     Jose M Snow NP  Critical Care Medicine  [facility]

## 2019-02-13 LAB
ANION GAP SERPL CALC-SCNC: 6 MMOL/L
ANION GAP SERPL CALC-SCNC: 8 MMOL/L
ANION GAP SERPL CALC-SCNC: 8 MMOL/L
ANISOCYTOSIS BLD QL SMEAR: SLIGHT
BASOPHILS # BLD AUTO: 0.01 K/UL
BASOPHILS # BLD AUTO: 0.02 K/UL
BASOPHILS # BLD AUTO: 0.03 K/UL
BASOPHILS NFR BLD: 0.2 %
BASOPHILS NFR BLD: 0.4 %
BASOPHILS NFR BLD: 0.5 %
BUN SERPL-MCNC: 23 MG/DL
BUN SERPL-MCNC: 31 MG/DL
BUN SERPL-MCNC: 31 MG/DL
CALCIUM SERPL-MCNC: 9.1 MG/DL
CALCIUM SERPL-MCNC: 9.1 MG/DL
CALCIUM SERPL-MCNC: 9.3 MG/DL
CHLORIDE SERPL-SCNC: 102 MMOL/L
CHLORIDE SERPL-SCNC: 103 MMOL/L
CHLORIDE SERPL-SCNC: 103 MMOL/L
CO2 SERPL-SCNC: 27 MMOL/L
CO2 SERPL-SCNC: 29 MMOL/L
CO2 SERPL-SCNC: 29 MMOL/L
CREAT SERPL-MCNC: 0.8 MG/DL
CREAT SERPL-MCNC: 0.9 MG/DL
CREAT SERPL-MCNC: 0.9 MG/DL
DIFFERENTIAL METHOD: ABNORMAL
EOSINOPHIL # BLD AUTO: 0.2 K/UL
EOSINOPHIL # BLD AUTO: 0.3 K/UL
EOSINOPHIL # BLD AUTO: 0.3 K/UL
EOSINOPHIL NFR BLD: 3.1 %
EOSINOPHIL NFR BLD: 4.6 %
EOSINOPHIL NFR BLD: 4.9 %
ERYTHROCYTE [DISTWIDTH] IN BLOOD BY AUTOMATED COUNT: 15.7 %
ERYTHROCYTE [DISTWIDTH] IN BLOOD BY AUTOMATED COUNT: 15.7 %
ERYTHROCYTE [DISTWIDTH] IN BLOOD BY AUTOMATED COUNT: 16 %
EST. GFR  (AFRICAN AMERICAN): >60 ML/MIN/1.73 M^2
EST. GFR  (NON AFRICAN AMERICAN): >60 ML/MIN/1.73 M^2
GLUCOSE SERPL-MCNC: 110 MG/DL
GLUCOSE SERPL-MCNC: 99 MG/DL
GLUCOSE SERPL-MCNC: 99 MG/DL
HCT VFR BLD AUTO: 24.2 %
HCT VFR BLD AUTO: 27.1 %
HCT VFR BLD AUTO: 27.7 %
HGB BLD-MCNC: 8 G/DL
HGB BLD-MCNC: 8.5 G/DL
HGB BLD-MCNC: 8.7 G/DL
IMM GRANULOCYTES # BLD AUTO: 0.03 K/UL
IMM GRANULOCYTES # BLD AUTO: 0.04 K/UL
IMM GRANULOCYTES # BLD AUTO: 0.04 K/UL
IMM GRANULOCYTES NFR BLD AUTO: 0.5 %
IMM GRANULOCYTES NFR BLD AUTO: 0.7 %
IMM GRANULOCYTES NFR BLD AUTO: 0.7 %
INR PPP: 1
LACTATE SERPL-SCNC: 1 MMOL/L
LYMPHOCYTES # BLD AUTO: 1 K/UL
LYMPHOCYTES # BLD AUTO: 1.1 K/UL
LYMPHOCYTES # BLD AUTO: 1.1 K/UL
LYMPHOCYTES NFR BLD: 18.8 %
LYMPHOCYTES NFR BLD: 18.8 %
LYMPHOCYTES NFR BLD: 19.6 %
MAGNESIUM SERPL-MCNC: 2.1 MG/DL
MAGNESIUM SERPL-MCNC: 2.1 MG/DL
MCH RBC QN AUTO: 29 PG
MCH RBC QN AUTO: 29.5 PG
MCH RBC QN AUTO: 30.3 PG
MCHC RBC AUTO-ENTMCNC: 31.4 G/DL
MCHC RBC AUTO-ENTMCNC: 31.4 G/DL
MCHC RBC AUTO-ENTMCNC: 33.1 G/DL
MCV RBC AUTO: 92 FL
MCV RBC AUTO: 93 FL
MCV RBC AUTO: 94 FL
MONOCYTES # BLD AUTO: 0.5 K/UL
MONOCYTES NFR BLD: 8.7 %
MONOCYTES NFR BLD: 9.1 %
MONOCYTES NFR BLD: 9.8 %
NEUTROPHILS # BLD AUTO: 3.7 K/UL
NEUTROPHILS # BLD AUTO: 3.7 K/UL
NEUTROPHILS # BLD AUTO: 3.9 K/UL
NEUTROPHILS NFR BLD: 66 %
NEUTROPHILS NFR BLD: 66.2 %
NEUTROPHILS NFR BLD: 67.4 %
NRBC BLD-RTO: 0 /100 WBC
PHOSPHATE SERPL-MCNC: 2.8 MG/DL
PLATELET # BLD AUTO: 140 K/UL
PLATELET # BLD AUTO: 145 K/UL
PLATELET # BLD AUTO: 153 K/UL
PMV BLD AUTO: 10.5 FL
PMV BLD AUTO: 10.5 FL
PMV BLD AUTO: 11.4 FL
POCT GLUCOSE: 102 MG/DL (ref 70–110)
POCT GLUCOSE: 105 MG/DL (ref 70–110)
POCT GLUCOSE: 159 MG/DL (ref 70–110)
POCT GLUCOSE: 92 MG/DL (ref 70–110)
POCT GLUCOSE: 95 MG/DL (ref 70–110)
POTASSIUM SERPL-SCNC: 3.6 MMOL/L
POTASSIUM SERPL-SCNC: 3.6 MMOL/L
POTASSIUM SERPL-SCNC: 4.2 MMOL/L
POTASSIUM SERPL-SCNC: 6.6 MMOL/L
PROTHROMBIN TIME: 10 SEC
RBC # BLD AUTO: 2.64 M/UL
RBC # BLD AUTO: 2.93 M/UL
RBC # BLD AUTO: 2.95 M/UL
SODIUM SERPL-SCNC: 135 MMOL/L
SODIUM SERPL-SCNC: 140 MMOL/L
SODIUM SERPL-SCNC: 140 MMOL/L
WBC # BLD AUTO: 5.43 K/UL
WBC # BLD AUTO: 5.65 K/UL
WBC # BLD AUTO: 5.91 K/UL

## 2019-02-13 PROCEDURE — 25000003 PHARM REV CODE 250: Performed by: NURSE PRACTITIONER

## 2019-02-13 PROCEDURE — 85610 PROTHROMBIN TIME: CPT

## 2019-02-13 PROCEDURE — 63600175 PHARM REV CODE 636 W HCPCS: Performed by: INTERNAL MEDICINE

## 2019-02-13 PROCEDURE — C9113 INJ PANTOPRAZOLE SODIUM, VIA: HCPCS | Performed by: INTERNAL MEDICINE

## 2019-02-13 PROCEDURE — 99233 SBSQ HOSP IP/OBS HIGH 50: CPT | Mod: ,,, | Performed by: NURSE PRACTITIONER

## 2019-02-13 PROCEDURE — 63600175 PHARM REV CODE 636 W HCPCS: Performed by: NURSE PRACTITIONER

## 2019-02-13 PROCEDURE — 20000000 HC ICU ROOM

## 2019-02-13 PROCEDURE — 80048 BASIC METABOLIC PNL TOTAL CA: CPT

## 2019-02-13 PROCEDURE — 97116 GAIT TRAINING THERAPY: CPT

## 2019-02-13 PROCEDURE — 83735 ASSAY OF MAGNESIUM: CPT | Mod: 91

## 2019-02-13 PROCEDURE — 63600175 PHARM REV CODE 636 W HCPCS: Performed by: PEDIATRICS

## 2019-02-13 PROCEDURE — 93010 RHYTHM STRIP: ICD-10-PCS | Mod: 76,ICN,, | Performed by: INTERNAL MEDICINE

## 2019-02-13 PROCEDURE — 93005 ELECTROCARDIOGRAM TRACING: CPT

## 2019-02-13 PROCEDURE — 94761 N-INVAS EAR/PLS OXIMETRY MLT: CPT

## 2019-02-13 PROCEDURE — 84100 ASSAY OF PHOSPHORUS: CPT

## 2019-02-13 PROCEDURE — 63600175 PHARM REV CODE 636 W HCPCS: Performed by: STUDENT IN AN ORGANIZED HEALTH CARE EDUCATION/TRAINING PROGRAM

## 2019-02-13 PROCEDURE — 83605 ASSAY OF LACTIC ACID: CPT

## 2019-02-13 PROCEDURE — 25000003 PHARM REV CODE 250: Performed by: STUDENT IN AN ORGANIZED HEALTH CARE EDUCATION/TRAINING PROGRAM

## 2019-02-13 PROCEDURE — 85025 COMPLETE CBC W/AUTO DIFF WBC: CPT

## 2019-02-13 PROCEDURE — 93010 ELECTROCARDIOGRAM REPORT: CPT | Mod: 76,ICN,, | Performed by: INTERNAL MEDICINE

## 2019-02-13 PROCEDURE — 93010 ELECTROCARDIOGRAM REPORT: CPT | Mod: ,,, | Performed by: INTERNAL MEDICINE

## 2019-02-13 PROCEDURE — 80048 BASIC METABOLIC PNL TOTAL CA: CPT | Mod: 91

## 2019-02-13 PROCEDURE — 84132 ASSAY OF SERUM POTASSIUM: CPT

## 2019-02-13 PROCEDURE — 99233 PR SUBSEQUENT HOSPITAL CARE,LEVL III: ICD-10-PCS | Mod: ,,, | Performed by: NURSE PRACTITIONER

## 2019-02-13 PROCEDURE — 97161 PT EVAL LOW COMPLEX 20 MIN: CPT

## 2019-02-13 PROCEDURE — 83735 ASSAY OF MAGNESIUM: CPT

## 2019-02-13 PROCEDURE — 27000221 HC OXYGEN, UP TO 24 HOURS

## 2019-02-13 RX ORDER — METOPROLOL TARTRATE 1 MG/ML
5 INJECTION, SOLUTION INTRAVENOUS EVERY 5 MIN PRN
Status: DISCONTINUED | OUTPATIENT
Start: 2019-02-13 | End: 2019-02-13

## 2019-02-13 RX ORDER — METOPROLOL TARTRATE 1 MG/ML
INJECTION, SOLUTION INTRAVENOUS
Status: DISPENSED
Start: 2019-02-13 | End: 2019-02-14

## 2019-02-13 RX ORDER — POTASSIUM CHLORIDE 7.45 MG/ML
10 INJECTION INTRAVENOUS
Status: COMPLETED | OUTPATIENT
Start: 2019-02-13 | End: 2019-02-13

## 2019-02-13 RX ORDER — PANTOPRAZOLE SODIUM 40 MG/1
40 TABLET, DELAYED RELEASE ORAL
Status: DISCONTINUED | OUTPATIENT
Start: 2019-02-13 | End: 2019-02-14 | Stop reason: HOSPADM

## 2019-02-13 RX ORDER — FUROSEMIDE 10 MG/ML
40 INJECTION INTRAMUSCULAR; INTRAVENOUS ONCE
Status: COMPLETED | OUTPATIENT
Start: 2019-02-13 | End: 2019-02-13

## 2019-02-13 RX ORDER — POTASSIUM CHLORIDE 14.9 MG/ML
20 INJECTION INTRAVENOUS ONCE
Status: DISCONTINUED | OUTPATIENT
Start: 2019-02-13 | End: 2019-02-13

## 2019-02-13 RX ORDER — PANTOPRAZOLE SODIUM 40 MG/1
40 TABLET, DELAYED RELEASE ORAL
Qty: 60 TABLET | Refills: 11 | Status: SHIPPED | OUTPATIENT
Start: 2019-02-13 | End: 2020-02-13

## 2019-02-13 RX ORDER — METOPROLOL TARTRATE 1 MG/ML
5 INJECTION, SOLUTION INTRAVENOUS ONCE
Status: DISCONTINUED | OUTPATIENT
Start: 2019-02-13 | End: 2019-02-13

## 2019-02-13 RX ORDER — METOPROLOL TARTRATE 1 MG/ML
5 INJECTION, SOLUTION INTRAVENOUS ONCE
Status: COMPLETED | OUTPATIENT
Start: 2019-02-13 | End: 2019-02-13

## 2019-02-13 RX ORDER — METOPROLOL TARTRATE 25 MG/1
12.5 TABLET ORAL 2 TIMES DAILY
Status: DISCONTINUED | OUTPATIENT
Start: 2019-02-13 | End: 2019-02-13

## 2019-02-13 RX ORDER — METOPROLOL TARTRATE 25 MG/1
25 TABLET, FILM COATED ORAL 2 TIMES DAILY
Qty: 60 TABLET | Refills: 11 | Status: SHIPPED | OUTPATIENT
Start: 2019-02-13 | End: 2019-02-14 | Stop reason: HOSPADM

## 2019-02-13 RX ORDER — METOPROLOL TARTRATE 25 MG/1
25 TABLET, FILM COATED ORAL 2 TIMES DAILY
Status: DISCONTINUED | OUTPATIENT
Start: 2019-02-13 | End: 2019-02-14 | Stop reason: HOSPADM

## 2019-02-13 RX ORDER — NOREPINEPHRINE BITARTRATE/D5W 4MG/250ML
0.02 PLASTIC BAG, INJECTION (ML) INTRAVENOUS CONTINUOUS
Status: DISCONTINUED | OUTPATIENT
Start: 2019-02-13 | End: 2019-02-13

## 2019-02-13 RX ORDER — POTASSIUM CHLORIDE 7.45 MG/ML
10 INJECTION INTRAVENOUS
Status: DISCONTINUED | OUTPATIENT
Start: 2019-02-13 | End: 2019-02-13

## 2019-02-13 RX ADMIN — PANTOPRAZOLE SODIUM 40 MG: 40 TABLET, DELAYED RELEASE ORAL at 05:02

## 2019-02-13 RX ADMIN — METOPROLOL TARTRATE 25 MG: 25 TABLET ORAL at 09:02

## 2019-02-13 RX ADMIN — FUROSEMIDE 40 MG: 10 INJECTION, SOLUTION INTRAVENOUS at 12:02

## 2019-02-13 RX ADMIN — PANTOPRAZOLE SODIUM 40 MG: 40 INJECTION, POWDER, FOR SOLUTION INTRAVENOUS at 09:02

## 2019-02-13 RX ADMIN — POTASSIUM CHLORIDE 10 MEQ: 10 INJECTION, SOLUTION INTRAVENOUS at 12:02

## 2019-02-13 RX ADMIN — METOPROLOL TARTRATE 5 MG: 1 INJECTION, SOLUTION INTRAVENOUS at 02:02

## 2019-02-13 RX ADMIN — POTASSIUM CHLORIDE 10 MEQ: 10 INJECTION, SOLUTION INTRAVENOUS at 06:02

## 2019-02-13 RX ADMIN — POTASSIUM CHLORIDE 10 MEQ: 10 INJECTION, SOLUTION INTRAVENOUS at 03:02

## 2019-02-13 RX ADMIN — METOPROLOL TARTRATE 12.5 MG: 25 TABLET, FILM COATED ORAL at 12:02

## 2019-02-13 RX ADMIN — SODIUM CHLORIDE, SODIUM LACTATE, POTASSIUM CHLORIDE, AND CALCIUM CHLORIDE 500 ML: .6; .31; .03; .02 INJECTION, SOLUTION INTRAVENOUS at 01:02

## 2019-02-13 RX ADMIN — POTASSIUM CHLORIDE 10 MEQ: 10 INJECTION, SOLUTION INTRAVENOUS at 04:02

## 2019-02-13 NOTE — PLAN OF CARE
Problem: Physical Therapy Goal  Goal: Physical Therapy Goal  Goals to be met by: 19    Patient will increase functional independence with mobility by performin. Supine to sit with Modified [place] -not met  2. Sit to stand transfer with Modified [place] -not met  3. Gait  x 250 feet with Contact Guard Assistance  -not met    Evaluation completed and goals appropriate. Sarah Lopez, PT 2019

## 2019-02-13 NOTE — PLAN OF CARE
[facility]  Department of Hospital Medicine  [address] [place] [address]  [phone] after hours  [phone] fax    HOSPICE  ORDERS    02/13/2019    Admit to Hospice:  Home Service     Diagnoses:   Active Hospital Problems    Diagnosis  POA    *GI bleed [K92.2]  Yes    Acute on chronic combined systolic and diastolic heart failure [I50.43]  Unknown    CAD (coronary artery disease) [I25.10]  Yes    Gout [M10.9]  Yes    Syncope [R55]  Yes    Essential hypertension [I10]  Yes    Bilateral pulmonary infiltrates on chest x-ray [R91.8]  Yes    QT prolongation [R94.31]  Yes    PAF (paroxysmal atrial fibrillation) [I48.0]  Yes    Retained urethral stent [Z96.0]  Not Applicable      Resolved Hospital Problems   No resolved problems to display.       Hospice Qualifying Diagnoses:        Patient has a life expectancy < 6 months due to:  1) Primary Hospice Diagnosis:  Gastric Mass (patient does not want to investigate mass beyond pending biopsy)  2) Comorbid Conditions Contributing to Decline: GI bleed, A-fib RVR    Vital Signs: Routine per Hospice Protocol.    Code Status: DNR/DNI    Allergies:   Review of patient's allergies indicates:   Allergen Reactions    Penicillins Hives       Diet: Regular    Activities: As tolerated    Nursing: Per Hospice Routine.     Medications:      Cash Rivera   Home Medication Instructions CHARLEEN:[id number]    Printed on:02/13/19 4943   Medication Information                      metoprolol tartrate (LOPRESSOR) 25 MG tablet  Take 1 tablet (25 mg total) by mouth 2 (two) times daily.             pantoprazole (PROTONIX) 40 MG tablet  Take 1 tablet (40 mg total) by mouth 2 (two) times daily before meals.                   Future Orders:  Hospice Medical Director may dictate new orders for comfortable care measures & sign death certificate.        _________________________________  Jose M Snow NP  02/13/2019

## 2019-02-13 NOTE — ASSESSMENT & PLAN NOTE
--patient arrived on amiodarone gtt, will hold for now.  --Sinus bradycardia noted on arrival.   --currently in NSR  --starting metoprolol 12.5mg PO BID

## 2019-02-13 NOTE — ASSESSMENT & PLAN NOTE
--EGD with GI showed source of bleeding is a mass (likely adenocarcinoma- patholoy pending).   --continue PPI 40mg IV BID  --CBC q12

## 2019-02-13 NOTE — PLAN OF CARE
Problem: Adult Inpatient Plan of Care  Goal: Plan of Care Review  Outcome: Ongoing (interventions implemented as appropriate)  Patient alert/oriented to person/time/place, following commands, and moving all extremities.  MAP's maintained >60, MD notified and at bedside overnight secondary to episodes of a-fib and hypotension; stat EKG obtained and 5 mg Metoprolol x2 doses given IVP; converted. Exernal male catheter:  mL/hr and clear/yellow; 500 mL LR bolus given overnight. Stage I to sacral area unchanged and presently covered with dry/intact foam dressing. Preventative foam dressings placed to bilateral heels and SCD's maintained to bilateral lower extremities.  MD notified with recent lab results/need for electrolyte replacement; K+ replaced. Patient turned and repositioned. Awaiting transfer to Telemetry unit. Patient has been updated on plan of care.  Will continue to monitor closely.

## 2019-02-13 NOTE — PROGRESS NOTES
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 Patient reports that his initial symptoms included syncope and "seeing black" while he was using chewing tobacco on 2/9/18. The event was witnessed by a family member who noted about 1 minute of turning his head to the left and shaking all over" per ED reports. At that time he also reports mild chest discomfort to include substernal CP and dysnpea that have lasted subacutely for 3-4 weeks. His initial Hb was 11.4 on 2/9, and [facility] developed hematemesis, symptomatic anemia and had a possible aspiration event and 2 point drop in Hb. He was placed on nasogastric suction with coffee ground aspirate, and was given PPI therapy and transferred to [facility] for GI evaluation with Neosyn at the OSH, then Levophed and amiodarone infusing for AF RVR. Upon arrival the patient had stable vital signs, sinus bradycardia, MAP of 70. He reports taking intermittent NSAIDs and Judith [place] for stomach pain. He reports only taking ASA81 for CAD given remote revascularization history with 4 stents per charting. He was being treated for aspiration pneumonia vs bibasilar infiltrates with Levaquin at the time of transfer, and was scheduled for outside hospital discharge until hematemesis developed. He is a DNR code status.     Hospital/ICU Course:  Following admission to MICU on 02/11, an EGD was conducted by ESTRELLA" who found multiple blood clots with concern for mass, no active bleeding, biopsy taken.  Will keep intubated overnight per GI request. Hypotension noted post-procedure, so levophed initiated and stat CBC which showed no decrease in hgb.  Will start intermittent fentanyl pushes with propofol for sedation given plans for possible extubation tomorrow. He was extubated on the morning of 02/12, to room air with prn nasal cannula. Antibiotics discontinued as low indications for pna, lasix ordered to remove excess fluid. Will plan to discuss the findings of his EGD and what Mr. Yanez would like to pursue if mass is malignant.       Interval History/Significant Events: no acute events overnight, considering home hospice depending on results of family meeting this afternoon    Review of Systems   Unable to perform ROS: Intubated     Objective:     Vital Signs (Most Recent):  Temp: 96.5 °F (35.8 °C) (02/13/19 1100)  Pulse: (!) 57 (02/13/19 1330)  Resp: (!) 24 (02/13/19 1330)  BP: 121/60 (02/13/19 1300)  SpO2: 100 % (02/13/19 1330) Vital Signs (24h Range):  Temp:  [96.5 °F (35.8 °C)-98.2 °F (36.8 °C)] 96.5 °F (35.8 °C)  Pulse:  [] 57  Resp:  [12-34] 24  SpO2:  [85 %-100 %] 100 %  BP: ()/(47-68) 121/60   Weight: 56.6 kg (124 lb 12.5 oz)  Body mass index is 19.54 kg/m².      Intake/Output Summary (Last 24 hours) at 2/13/2019 1347  Last data filed at 2/13/2019 1247  Gross per 24 hour   Intake 684 ml   Output 3155 ml   Net -2471 ml       Physical Exam   Constitutional: He appears well-developed. He does not have a sickly appearance. No distress. He is intubated.   HENT:   Head: Normocephalic and atraumatic.   Eyes: Conjunctivae are normal. Pupils are equal, round, and reactive to light. Right eye exhibits no exudate. Left eye exhibits no exudate. Right conjunctiva has no hemorrhage. Left conjunctiva has no hemorrhage. No scleral icterus. Right eye exhibits no nystagmus. Left eye exhibits no nystagmus.   Neck: Trachea  normal. No neck rigidity. No tracheal deviation present.   Cardiovascular: Normal rate, regular rhythm and normal heart sounds. PMI is not displaced. Exam reveals no gallop and no friction rub.   No murmur heard.  Pulses:       Radial pulses are 2+ on the right side, and 2+ on the left side.        Dorsalis pedis pulses are 2+ on the right side, and 2+ on the left side.   Pulmonary/Chest: Effort normal and breath sounds normal. No accessory muscle usage. He is intubated. No respiratory distress. He has no wheezes. He has no rhonchi. He has no rales.   Abdominal: Soft. Normal appearance and bowel sounds are normal. There is no tenderness.   Musculoskeletal: Normal range of motion.   Neurological: He is alert. No cranial nerve deficit or sensory deficit. GCS eye subscore is 4. GCS verbal subscore is 1. GCS motor subscore is 6.   Alert and following commands, no neuro deficits appreciated.    Skin: Skin is warm and dry. Capillary refill takes 2 to 3 seconds. He is not diaphoretic. No cyanosis. Nails show no clubbing.   Nursing note and vitals reviewed.      Vents:  Vent Mode: Spont (02/12/19 0843)  Ventilator Initiated: Yes (02/11/19 1439)  Set Rate: 16 bmp (02/12/19 0528)  Vt Set: 375 mL (02/12/19 0528)  Pressure Support: 0 cmH20 (02/12/19 0843)  PEEP/CPAP: 5 cmH20 (02/12/19 0843)  Oxygen Concentration (%): 100 (02/12/19 0845)  Peak Airway Pressure: 7.1 cmH2O (02/12/19 0843)  Plateau Pressure: 0 cmH20 (02/12/19 0843)  Total Ve: 12.3 mL (02/12/19 0843)  Negative Inspiratory Force (cm H2O): -36 (02/12/19 0843)  F/VT Ratio<105 (RSBI): 113.45 (02/12/19 0843)  Lines/Drains/Airways     Drain            Male External Urinary Catheter 02/11/19 0800 Medium 2 days          Pressure Ulcer                 Pressure Injury 02/11/19 0230 Sacral spine Stage 1 2 days          Peripheral Intravenous Line                 Peripheral IV - Single Lumen 02/12/19 1600 Right Antecubital less than 1 day              Significant  Labs:    CBC/Anemia Profile:  Recent Labs   Lab 02/12/19  1600 02/13/19  0144 02/13/19 0227   WBC 6.92 5.91 5.65   HGB 9.2* 8.7* 8.5*   HCT 28.6* 27.7* 27.1*    153 140*   MCV 93 94 93   RDW 15.9* 15.7* 15.7*        Chemistries:  Recent Labs   Lab 02/12/19  0330 02/12/19  0846 02/13/19 0227   NA  --  139 140  140   K  --  3.9  3.9 3.6  3.6   CL  --  107 103  103   CO2  --  25 29  29   BUN  --  32* 31*  31*   CREATININE  --  0.9 0.9  0.9   CALCIUM  --  8.6* 9.1  9.1   MG 2.2  --  2.1   PHOS 1.7*  --  2.8       All pertinent labs within the past 24 hours have been reviewed.    Significant Imaging:  I have reviewed all pertinent imaging results/findings within the past 24 hours.      ABG  Recent Labs   Lab 02/11/19  1737   PH 7.399   PO2 198*   PCO2 40.7   HCO3 25.2   BE 0     Assessment/Plan:     Pulmonary   Bilateral pulmonary infiltrates on chest x-ray    -etiology possibly chronic parenchymal lung disease, less likely new aspiration event  --d/c abx     Cardiac/Vascular   Acute on chronic combined systolic and diastolic heart failure    --reduced EF 35%  --continue to diurese with lasix for excess fluid removal     PAF (paroxysmal atrial fibrillation)    --patient arrived on amiodarone gtt, will hold for now.  --Sinus bradycardia noted on arrival.   --currently in NSR  --starting metoprolol 12.5mg PO BID     QT prolongation    -avoid FQ antibiotics (received Levaquin prior to arrival ) and Zofran at this time.      Essential hypertension    --holding home htn medicartions  --shock resolved, off pressors  --normotensive at this time     CAD (coronary artery disease)    --remote history of stenting, on ASA81, atorvastatin, atenolol at home, SLNTG PRN.   --on hold following EGD   --no current angina concern for ACS. Avoid FQ antibiotics and Zofran given QTc       Renal/   Retained urethral stent    --continue condom catheter for now  --if hayes placement indicated, please call urology     GI   * GI  bleed    --EGD with GI showed source of bleeding is a mass (likely adenocarcinoma- patholoy pending).   --continue PPI 40mg IV BID  --CBC q12        Orthopedic   Gout    -on allopurinol daily, resume when GIB stable.      Other   Syncope    --Etiology symptomatic anemia vs cardiogenic   --paroxysmal afib with RVR noted at outside hospital and in am 02/12  --Echo showed EF 35% and stage II diastolic dysfunction   --Cannot tolerate anticoagulation at this time with GIB.            I spent >70 minutes reviewing patient records, examining, and counseling the patient with greater than 50% of the time spent with direct patient care and coordination.        Jose M Snow NP  Critical Care Medicine  [facility]

## 2019-02-13 NOTE — PLAN OF CARE
Referral send to [facility] [place], [place] Ph# [phone]/F#[phone]. POC is Gabby, deborahator. Pt is expected to dc tomorrow. Marcia Ulrich is in agreement w/DC plan.      02/13/19 1621   Post-Acute Status   Post-Acute Authorization Home Health/Hospice   Home Health/Hospice Status Pending Clinical Review

## 2019-02-13 NOTE — SUBJECTIVE & OBJECTIVE
Interval History/Significant Events: no acute events overnight, considering home hospice depending on results of family meeting this afternoon    Review of Systems   Unable to perform ROS: Intubated     Objective:     Vital Signs (Most Recent):  Temp: 96.5 °F (35.8 °C) (02/13/19 1100)  Pulse: (!) 57 (02/13/19 1330)  Resp: (!) 24 (02/13/19 1330)  BP: 121/60 (02/13/19 1300)  SpO2: 100 % (02/13/19 1330) Vital Signs (24h Range):  Temp:  [96.5 °F (35.8 °C)-98.2 °F (36.8 °C)] 96.5 °F (35.8 °C)  Pulse:  [] 57  Resp:  [12-34] 24  SpO2:  [85 %-100 %] 100 %  BP: ()/(47-68) 121/60   Weight: 56.6 kg (124 lb 12.5 oz)  Body mass index is 19.54 kg/m².      Intake/Output Summary (Last 24 hours) at 2/13/2019 1347  Last data filed at 2/13/2019 1247  Gross per 24 hour   Intake 684 ml   Output 3155 ml   Net -2471 ml       Physical Exam   Constitutional: He appears well-developed. He does not have a sickly appearance. No distress. He is intubated.   HENT:   Head: Normocephalic and atraumatic.   Eyes: Conjunctivae are normal. Pupils are equal, round, and reactive to light. Right eye exhibits no exudate. Left eye exhibits no exudate. Right conjunctiva has no hemorrhage. Left conjunctiva has no hemorrhage. No scleral icterus. Right eye exhibits no nystagmus. Left eye exhibits no nystagmus.   Neck: Trachea normal. No neck rigidity. No tracheal deviation present.   Cardiovascular: Normal rate, regular rhythm and normal heart sounds. PMI is not displaced. Exam reveals no gallop and no friction rub.   No murmur heard.  Pulses:       Radial pulses are 2+ on the right side, and 2+ on the left side.        Dorsalis pedis pulses are 2+ on the right side, and 2+ on the left side.   Pulmonary/Chest: Effort normal and breath sounds normal. No accessory muscle usage. He is intubated. No respiratory distress. He has no wheezes. He has no rhonchi. He has no rales.   Abdominal: Soft. Normal appearance and bowel sounds are normal. There is no  tenderness.   Musculoskeletal: Normal range of motion.   Neurological: He is alert. No cranial nerve deficit or sensory deficit. GCS eye subscore is 4. GCS verbal subscore is 1. GCS motor subscore is 6.   Alert and following commands, no neuro deficits appreciated.    Skin: Skin is warm and dry. Capillary refill takes 2 to 3 seconds. He is not diaphoretic. No cyanosis. Nails show no clubbing.   Nursing note and vitals reviewed.      Vents:  Vent Mode: Spont (02/12/19 0843)  Ventilator Initiated: Yes (02/11/19 1439)  Set Rate: 16 bmp (02/12/19 0528)  Vt Set: 375 mL (02/12/19 0528)  Pressure Support: 0 cmH20 (02/12/19 0843)  PEEP/CPAP: 5 cmH20 (02/12/19 0843)  Oxygen Concentration (%): 100 (02/12/19 0845)  Peak Airway Pressure: 7.1 cmH2O (02/12/19 0843)  Plateau Pressure: 0 cmH20 (02/12/19 0843)  Total Ve: 12.3 mL (02/12/19 0843)  Negative Inspiratory Force (cm H2O): -36 (02/12/19 0843)  F/VT Ratio<105 (RSBI): 113.45 (02/12/19 0843)  Lines/Drains/Airways     Drain            Male External Urinary Catheter 02/11/19 0800 Medium 2 days          Pressure Ulcer                 Pressure Injury 02/11/19 0230 Sacral spine Stage 1 2 days          Peripheral Intravenous Line                 Peripheral IV - Single Lumen 02/12/19 1600 Right Antecubital less than 1 day              Significant Labs:    CBC/Anemia Profile:  Recent Labs   Lab 02/12/19  1600 02/13/19  0144 02/13/19 0227   WBC 6.92 5.91 5.65   HGB 9.2* 8.7* 8.5*   HCT 28.6* 27.7* 27.1*    153 140*   MCV 93 94 93   RDW 15.9* 15.7* 15.7*        Chemistries:  Recent Labs   Lab 02/12/19  0330 02/12/19  0846 02/13/19  0227   NA  --  139 140  140   K  --  3.9  3.9 3.6  3.6   CL  --  107 103  103   CO2  --  25 29  29   BUN  --  32* 31*  31*   CREATININE  --  0.9 0.9  0.9   CALCIUM  --  8.6* 9.1  9.1   MG 2.2  --  2.1   PHOS 1.7*  --  2.8       All pertinent labs within the past 24 hours have been reviewed.    Significant Imaging:  I have reviewed all  pertinent imaging results/findings within the past 24 hours.

## 2019-02-13 NOTE — PT/OT/SLP EVAL
Physical Therapy Evaluation and treatment    Patient Name:  Cash Rivera   MRN:  [id number]    Recommendations:     Discharge Recommendations:  (HHPT)   Discharge Equipment Recommendations: none   Barriers to discharge: None    Assessment:     Cash Rivera is a 78 y.o. male admitted with a medical diagnosis of GI bleed.  He presents with the following impairments/functional limitations:  gait instability, impaired balance, impaired endurance, decreased coordination pt mary jane treatment fair and would benefit from skilled PT 4x/wk to progress physically. Pt will be able to discharge home with HHPT when medically stable.    Rehab Prognosis: Good; patient would benefit from acute skilled PT services to address these deficits and reach maximum level of function.    Recent Surgery: Procedure(s) (LRB):  EGD (ESOPHAGOGASTRODUODENOSCOPY) (N/A) 2 Days Post-Op    Plan:     During this hospitalization, patient to be seen 4 x/week to address the identified rehab impairments via gait training, therapeutic activities and progress toward the following goals:    · Plan of Care Expires:  03/10/19    Subjective     Chief Complaint: pt had no complaints during treatment.   Patient/Family Comments/goals:  To get better and go home.   Pain/Comfort:  · Pain Rating 1: 0/10  · Pain Rating Post-Intervention 1: 0/10    Patients cultural, spiritual, Orthodox conflicts given the current situation: no    Living Environment:  Pt is retired and lives alone in 1 story with slab entrance. Pt ambulated household level independently and uses walking stick in community.   Prior to admission, patients level of function was Independent.  Equipment used at home: bedside commode, bath bench, rollator, wheelchair(walking stick).  DME owned (not currently used): none.  Upon discharge, patient will have assistance from daughter and granddaughter who live near by. .    Objective:     Communicated with nurse prior to session.  Patient found supine with   telemetry, blood pressure cuff, pulse ox (continuous), oxygen, peripheral IV, SCD, hayes catheter  upon PT entry to room.    General Precautions: Standard, fall   Orthopedic Precautions:    Braces:       Exams:  · Cognitive Exam:  Patient is oriented to Person, Place, Time and Situation  · RLE ROM: WFL  · RLE Strength: WFL  · LLE ROM: WFL  · LLE Strength: WFL    Functional Mobility:  · Bed Mobility:  Pt needed verbal cues for hand placement and sequencing for functional mobility.   · Rolling right- CGA  · Supine to sit - CGA  ·    · Transfers:     · Sit to Stand:  contact guard assistance with hand-held assist  ·   · Gait: pt received gait training ~ 18 ft with HHA.         AM-PAC 6 CLICK MOBILITY  Total Score:16     Patient left up in chair with all lines intact and call button in reach.    GOALS:   Multidisciplinary Problems     Physical Therapy Goals        Problem: Physical Therapy Goal    Goal Priority Disciplines Outcome Goal Variances Interventions   Physical Therapy Goal     PT, PT/OT      Description:  Goals to be met by: 19    Patient will increase functional independence with mobility by performin. Supine to sit with Modified [place] -not met  2. Sit to stand transfer with Modified [place] -not met  3. Gait  x 250 feet with Contact Guard Assistance  -not met                      History:     Past Medical History:   Diagnosis Date    Coronary artery disease     Hypertension        Past Surgical History:   Procedure Laterality Date    ARM AMPUTATION AT ELBOW Right     EGD (ESOPHAGOGASTRODUODENOSCOPY) N/A 2019    Performed by Khari Rudd MD at [facility] ([address] [place] [address])       Time Tracking:     PT Received On: 19  PT Start Time: 1331     PT Stop Time: 1350  PT Total Time (min): 19 min     Billable Minutes: Evaluation 8 min and Gait Training 11 min      Sarah Lopez, PT  2019

## 2019-02-13 NOTE — PROGRESS NOTES
MD notified concerning recent episodes of hypotension, a-fib HR of 120's-150's that began around 0145 and recent STAT CBC and EKG results.  Dr. Goel reporting to bedside momentarily.

## 2019-02-14 VITALS
WEIGHT: 124.75 LBS | OXYGEN SATURATION: 100 % | BODY MASS INDEX: 19.58 KG/M2 | HEIGHT: 67 IN | RESPIRATION RATE: 20 BRPM | TEMPERATURE: 98 F | DIASTOLIC BLOOD PRESSURE: 59 MMHG | SYSTOLIC BLOOD PRESSURE: 105 MMHG | HEART RATE: 69 BPM

## 2019-02-14 PROBLEM — I47.19 MULTIFOCAL ATRIAL TACHYCARDIA: Status: ACTIVE | Noted: 2019-02-11

## 2019-02-14 PROBLEM — R94.31 QT PROLONGATION: Status: RESOLVED | Noted: 2019-02-11 | Resolved: 2019-02-14

## 2019-02-14 LAB
ANION GAP SERPL CALC-SCNC: 4 MMOL/L
BASOPHILS # BLD AUTO: 0.02 K/UL
BASOPHILS NFR BLD: 0.4 %
BUN SERPL-MCNC: 20 MG/DL
CALCIUM SERPL-MCNC: 9.2 MG/DL
CHLORIDE SERPL-SCNC: 104 MMOL/L
CO2 SERPL-SCNC: 28 MMOL/L
CREAT SERPL-MCNC: 0.8 MG/DL
DIFFERENTIAL METHOD: ABNORMAL
EOSINOPHIL # BLD AUTO: 0.2 K/UL
EOSINOPHIL NFR BLD: 4 %
ERYTHROCYTE [DISTWIDTH] IN BLOOD BY AUTOMATED COUNT: 15.4 %
EST. GFR  (AFRICAN AMERICAN): >60 ML/MIN/1.73 M^2
EST. GFR  (NON AFRICAN AMERICAN): >60 ML/MIN/1.73 M^2
GLUCOSE SERPL-MCNC: 100 MG/DL
HCT VFR BLD AUTO: 27 %
HGB BLD-MCNC: 8.6 G/DL
IMM GRANULOCYTES # BLD AUTO: 0.03 K/UL
IMM GRANULOCYTES NFR BLD AUTO: 0.6 %
INR PPP: 1
LYMPHOCYTES # BLD AUTO: 1.1 K/UL
LYMPHOCYTES NFR BLD: 20 %
MAGNESIUM SERPL-MCNC: 2 MG/DL
MCH RBC QN AUTO: 29.6 PG
MCHC RBC AUTO-ENTMCNC: 31.9 G/DL
MCV RBC AUTO: 93 FL
MONOCYTES # BLD AUTO: 0.4 K/UL
MONOCYTES NFR BLD: 7.4 %
NEUTROPHILS # BLD AUTO: 3.6 K/UL
NEUTROPHILS NFR BLD: 67.6 %
NRBC BLD-RTO: 0 /100 WBC
PHOSPHATE SERPL-MCNC: 2.5 MG/DL
PLATELET # BLD AUTO: 171 K/UL
PMV BLD AUTO: 10.7 FL
POCT GLUCOSE: 100 MG/DL (ref 70–110)
POCT GLUCOSE: 122 MG/DL (ref 70–110)
POTASSIUM SERPL-SCNC: 4.1 MMOL/L
PROTHROMBIN TIME: 10.3 SEC
RBC # BLD AUTO: 2.91 M/UL
SODIUM SERPL-SCNC: 136 MMOL/L
WBC # BLD AUTO: 5.3 K/UL

## 2019-02-14 PROCEDURE — 25000003 PHARM REV CODE 250: Performed by: NURSE PRACTITIONER

## 2019-02-14 PROCEDURE — 83735 ASSAY OF MAGNESIUM: CPT

## 2019-02-14 PROCEDURE — 80048 BASIC METABOLIC PNL TOTAL CA: CPT

## 2019-02-14 PROCEDURE — 84100 ASSAY OF PHOSPHORUS: CPT

## 2019-02-14 PROCEDURE — 99233 SBSQ HOSP IP/OBS HIGH 50: CPT | Mod: ,,, | Performed by: NURSE PRACTITIONER

## 2019-02-14 PROCEDURE — 85610 PROTHROMBIN TIME: CPT

## 2019-02-14 PROCEDURE — 99233 PR SUBSEQUENT HOSPITAL CARE,LEVL III: ICD-10-PCS | Mod: ,,, | Performed by: NURSE PRACTITIONER

## 2019-02-14 PROCEDURE — 85025 COMPLETE CBC W/AUTO DIFF WBC: CPT

## 2019-02-14 RX ORDER — METOPROLOL TARTRATE 25 MG/1
25 TABLET, FILM COATED ORAL 2 TIMES DAILY
Status: DISCONTINUED | OUTPATIENT
Start: 2019-02-14 | End: 2019-02-14

## 2019-02-14 RX ORDER — METOPROLOL TARTRATE 1 MG/ML
2.5 INJECTION, SOLUTION INTRAVENOUS ONCE
Status: COMPLETED | OUTPATIENT
Start: 2019-02-14 | End: 2019-02-14

## 2019-02-14 RX ORDER — ALLOPURINOL 300 MG/1
300 TABLET ORAL 2 TIMES DAILY
COMMUNITY

## 2019-02-14 RX ORDER — PANTOPRAZOLE SODIUM 40 MG/1
40 TABLET, DELAYED RELEASE ORAL
Qty: 60 TABLET | Refills: 11 | Status: SHIPPED | OUTPATIENT
Start: 2019-02-14 | End: 2020-02-14

## 2019-02-14 RX ORDER — ATENOLOL 50 MG/1
50 TABLET ORAL DAILY
Status: ON HOLD | COMMUNITY
End: 2019-02-14 | Stop reason: HOSPADM

## 2019-02-14 RX ORDER — METOPROLOL TARTRATE 25 MG/1
25 TABLET, FILM COATED ORAL 2 TIMES DAILY
Qty: 60 TABLET | Refills: 11 | Status: SHIPPED | OUTPATIENT
Start: 2019-02-14 | End: 2020-02-14

## 2019-02-14 RX ORDER — ASPIRIN 81 MG/1
81 TABLET ORAL DAILY
Status: ON HOLD | COMMUNITY
End: 2019-02-14 | Stop reason: HOSPADM

## 2019-02-14 RX ORDER — SODIUM,POTASSIUM PHOSPHATES 280-250MG
2 POWDER IN PACKET (EA) ORAL ONCE
Status: COMPLETED | OUTPATIENT
Start: 2019-02-14 | End: 2019-02-14

## 2019-02-14 RX ORDER — ATORVASTATIN CALCIUM 40 MG/1
40 TABLET, FILM COATED ORAL DAILY
COMMUNITY

## 2019-02-14 RX ADMIN — METOPROLOL TARTRATE 2.5 MG: 1 INJECTION, SOLUTION INTRAVENOUS at 03:02

## 2019-02-14 RX ADMIN — POTASSIUM & SODIUM PHOSPHATES POWDER PACK 280-160-250 MG 2 PACKET: 280-160-250 PACK at 10:02

## 2019-02-14 RX ADMIN — PANTOPRAZOLE SODIUM 40 MG: 40 TABLET, DELAYED RELEASE ORAL at 06:02

## 2019-02-14 RX ADMIN — METOPROLOL TARTRATE 25 MG: 25 TABLET ORAL at 02:02

## 2019-02-14 RX ADMIN — METOPROLOL TARTRATE 2.5 MG: 1 INJECTION, SOLUTION INTRAVENOUS at 01:02

## 2019-02-14 NOTE — NURSING
1500: Metoprolol 2.5 mg IV given.   15:12: Patient returned back to normal sinus rhythm at 72 bpm. Jose M Snow NP notified of sustained NSR  1530: Second AVS printed and given to Marcia Allred (daughter of patient) with verbalization of understanding of medication change.     15:32: Patient discharged.

## 2019-02-14 NOTE — ASSESSMENT & PLAN NOTE
--remote history of stenting, resume atorvastatin,   --change home atenolol to metoprolol 25mg BID   --do not restart aspirin secondary to GI bleed

## 2019-02-14 NOTE — NURSING
[id number]: Patient and family given discharged instructions. Patient goes into atrial fibrillation with a heart rate of 142-156 bpm. Jose M Snow NP notified of patient's change in status.   1345: Metoprolol 2.5 mg IV ordered and given. Goal remains for patient to break atrial fibrillation on his own and then proceed with discharge.  [id number]: Patient's heart rhythm remains in atrial fibrillation with 120-130 bpm. Jose M Snow NP notified. Instructed to give metoprolol 25 mg PO with previous goal remaining.  1408: Metoprolol tablet 25mg PO given   1450: Jose M Snow NP, at bedside. Metoprolol 2.5 mg IV ordered

## 2019-02-14 NOTE — ASSESSMENT & PLAN NOTE
--v A- fib RVR   --patient arrived on amiodarone gtt, d/c'd  --Sinus bradycardia noted on arrival.   --currently in NSR 70  --discharging with metoprolol 25mg BID

## 2019-02-14 NOTE — PLAN OF CARE
Problem: Adult Inpatient Plan of Care  Goal: Plan of Care Review  Outcome: Ongoing (interventions implemented as appropriate)  Patient AAO. MAPs maintained >60. Afebrile. O2 Sats >92% on RA. HR currently in the 60's, pt with episode of tachycardia, see previous note. Condom cath removed this afternoon, UOP adequate throughout shift. On regular diet, tolerating well. Blood sugar checks completed q6. Denies pain. Plan of care reviewed with pt and family. WCJIM.

## 2019-02-14 NOTE — PROGRESS NOTES
"[facility]  Critical Care Medicine  Progress Note    Patient Name: Cash Rivera  MRN: [id number]  Admission Date: 2/11/2019  Hospital Length of Stay: 3 days  Code Status: DNR  Attending Provider: No att. 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 Patient reports that his initial symptoms included syncope and "seeing black" while he was using chewing tobacco on 2/9/18. The event was witnessed by a family member who noted about 1 minute of turning his head to the left and shaking all over" per ED reports. At that time he also reports mild chest discomfort to include substernal CP and dysnpea that have lasted subacutely for 3-4 weeks. His initial Hb was 11.4 on 2/9, and during his stay at [facility], he developed hematemesis, symptomatic anemia and had a possible aspiration event and 2 point drop in Hb. He was placed on nasogastric suction with coffee ground aspirate, and was given PPI therapy and transferred to [facility] for GI evaluation with Neosyn at the OSH, then Levophed and amiodarone infusing for AF RVR. Upon arrival the patient had stable vital signs, sinus bradycardia, MAP of 70. He reports taking intermittent NSAIDs and Judith Casmalia for stomach pain. He reports only taking ASA81 for CAD given remote revascularization history with 4 stents per charting. He was being treated for aspiration pneumonia vs bibasilar infiltrates with Levaquin at the time of transfer, and was scheduled for outside hospital discharge until hematemesis developed. He is a DNR code status.     Hospital/ICU Course:  Following admission to MICU on 02/11, an EGD was conducted by " GI who found multiple blood clots with concern for mass, no active bleeding, biopsy taken.  Will keep intubated overnight per GI request. Hypotension noted post-procedure, so levophed initiated and stat CBC which showed no decrease in hgb.  Will start intermittent fentanyl pushes with propofol for sedation given plans for possible extubation tomorrow. He was extubated on the morning of 02/12, to room air with prn nasal cannula. Antibiotics discontinued as low indications for pna, lasix ordered to remove excess fluid. Met with patient and his daughter and granddaughter on 2/13 to discuss goals of care. Mr. Rivera has made it his wish that no further investigation of his EGD results beyond the biopsy which is still pending. Plans for discharge with home hospice have been arranged for his discharge on 2/14.        Interval History/Significant Events: no acute events overnight, considering home hospice depending on results of family meeting this afternoon    Review of Systems   Constitutional: Negative for chills and fever.   HENT: Negative for sore throat and trouble swallowing.    Eyes: Negative for discharge and visual disturbance.   Respiratory: Negative for cough and shortness of breath.    Cardiovascular: Negative for chest pain and palpitations.   Gastrointestinal: Negative for abdominal distention and abdominal pain.   Endocrine: Negative for polydipsia and polyuria.   Genitourinary: Negative for frequency and hematuria.   Musculoskeletal: Negative for joint swelling and myalgias.   Skin: Negative for color change and rash.   Neurological: Negative for dizziness and headaches.   Hematological: Negative for adenopathy. Does not bruise/bleed easily.     Objective:     Vital Signs (Most Recent):  Temp: 97.5 °F (36.4 °C) (02/14/19 1100)  Pulse: 69 (02/14/19 1515)  Resp: 20 (02/14/19 1515)  BP: (!) 105/59 (02/14/19 1515)  SpO2: 100 % (02/14/19 1515) Vital Signs (24h Range):  Temp:  [97.5 °F (36.4 °C)-97.7 °F (36.5  °C)] 97.5 °F (36.4 °C)  Pulse:  [] 69  Resp:  [12-36] 20  SpO2:  [87 %-100 %] 100 %  BP: ()/(42-61) 105/59   Weight: 56.6 kg (124 lb 12.5 oz)  Body mass index is 19.54 kg/m².      Intake/Output Summary (Last 24 hours) at 2/14/2019 1531  Last data filed at 2/14/2019 1100  Gross per 24 hour   Intake 310 ml   Output 1270 ml   Net -960 ml       Physical Exam   Constitutional: He appears well-developed. He does not have a sickly appearance. No distress.   HENT:   Head: Normocephalic and atraumatic.   Eyes: Conjunctivae are normal. Pupils are equal, round, and reactive to light. Right eye exhibits no exudate. Left eye exhibits no exudate. Right conjunctiva has no hemorrhage. Left conjunctiva has no hemorrhage. No scleral icterus. Right eye exhibits no nystagmus. Left eye exhibits no nystagmus.   Neck: Trachea normal. No neck rigidity. No tracheal deviation present.   Cardiovascular: Normal rate, regular rhythm and normal heart sounds. PMI is not displaced. Exam reveals no gallop and no friction rub.   No murmur heard.  Pulses:       Radial pulses are 2+ on the right side, and 2+ on the left side.        Dorsalis pedis pulses are 2+ on the right side, and 2+ on the left side.   Periodic episodes of MAT noted, however on exam this am, he is in NSR   Pulmonary/Chest: Effort normal and breath sounds normal. No accessory muscle usage. No respiratory distress. He has no wheezes. He has no rhonchi. He has no rales.   Abdominal: Soft. Normal appearance and bowel sounds are normal. There is no tenderness.   Musculoskeletal: Normal range of motion.   Neurological: He is alert. No cranial nerve deficit or sensory deficit. GCS eye subscore is 4. GCS verbal subscore is 1. GCS motor subscore is 6.   Alert and following commands, no neuro deficits appreciated.    Skin: Skin is warm and dry. Capillary refill takes 2 to 3 seconds. He is not diaphoretic. No cyanosis. Nails show no clubbing.   Nursing note and vitals  reviewed.      Vents:  Vent Mode: Spont (02/12/19 0843)  Ventilator Initiated: Yes (02/11/19 1439)  Set Rate: 16 bmp (02/12/19 0528)  Vt Set: 375 mL (02/12/19 0528)  Pressure Support: 0 cmH20 (02/12/19 0843)  PEEP/CPAP: 5 cmH20 (02/12/19 0843)  Oxygen Concentration (%): 100 (02/14/19 1100)  Peak Airway Pressure: 7.1 cmH2O (02/12/19 0843)  Plateau Pressure: 0 cmH20 (02/12/19 0843)  Total Ve: 12.3 mL (02/12/19 0843)  Negative Inspiratory Force (cm H2O): -36 (02/12/19 0843)  F/VT Ratio<105 (RSBI): 113.45 (02/12/19 0843)  Lines/Drains/Airways     Pressure Ulcer                 Pressure Injury 02/11/19 0230 Sacral spine Stage 1 3 days              Significant Labs:    CBC/Anemia Profile:  Recent Labs   Lab 02/13/19 0227 02/13/19  1716 02/14/19  0500   WBC 5.65 5.43 5.30   HGB 8.5* 8.0* 8.6*   HCT 27.1* 24.2* 27.0*   * 145* 171   MCV 93 92 93   RDW 15.7* 16.0* 15.4*        Chemistries:  Recent Labs   Lab 02/13/19  0227 02/13/19  1359 02/13/19  1509 02/14/19  0500     140 135*  --  136   K 3.6  3.6 6.6* 4.2 4.1     103 102  --  104   CO2 29  29 27  --  28   BUN 31*  31* 23  --  20   CREATININE 0.9  0.9 0.8  --  0.8   CALCIUM 9.1  9.1 9.3  --  9.2   MG 2.1  --  2.1 2.0   PHOS 2.8  --   --  2.5*       All pertinent labs within the past 24 hours have been reviewed.    Significant Imaging:  I have reviewed all pertinent imaging results/findings within the past 24 hours.      ABG  Recent Labs   Lab 02/11/19  1737   PH 7.399   PO2 198*   PCO2 40.7   HCO3 25.2   BE 0     Assessment/Plan:     Pulmonary   Bilateral pulmonary infiltrates on chest x-ray    -etiology possibly chronic parenchymal lung disease, less likely new aspiration event  --d/c abx     Cardiac/Vascular   Acute on chronic combined systolic and diastolic heart failure    --reduced EF 35%  --continue to diurese with lasix for excess fluid removal     Multifocal atrial tachycardia    --v A- fib RVR   --patient arrived on amiodarone gtt,  d/c'd  --Sinus bradycardia noted on arrival.   --currently in NSR 70  --discharging with metoprolol 25mg BID     Essential hypertension    --resume beta blocker therapy     CAD (coronary artery disease)    --remote history of stenting, resume atorvastatin,   --change home atenolol to metoprolol 25mg BID   --do not restart aspirin secondary to GI bleed     Renal/   Retained urethral stent    --voids without issue     GI   * GI bleed    --EGD with GI showed source of bleeding is a mass (likely adenocarcinoma- pathology pending).   --continue PPI 40mg PO BID         Orthopedic   Gout    -on allopurinol daily, resume on discharge      Other   Syncope    --Etiology symptomatic anemia vs cardiogenic   --paroxysmal afib with RVR??? vs MAT noted at outside hospital and in am 02/12  --Echo showed EF 35% and stage II diastolic dysfunction   --Cannot tolerate anticoagulation at this time with GIB, not restating asp on d/c         I spent >70 minutes reviewing patient records, examining, and counseling the patient with greater than 50% of the time spent with direct patient care and coordination.        Jose M Snow NP  Critical Care Medicine  [facility]

## 2019-02-14 NOTE — PLAN OF CARE
Pt's family @ bedside; SW provided Parth, nurse, w/packet to give to family w/discharge info for Hospice Agency.  SW have completed dc paperwork needs and Pt ready for discharge once medical management approves.     02/14/19 1125   Post-Acute Status   Post-Acute Authorization Home Health/Hospice

## 2019-02-14 NOTE — ASSESSMENT & PLAN NOTE
--EGD with GI showed source of bleeding is a mass (likely adenocarcinoma- pathology pending).   --continue PPI 40mg PO BID

## 2019-02-14 NOTE — ASSESSMENT & PLAN NOTE
--Etiology symptomatic anemia vs cardiogenic   --paroxysmal afib with RVR??? vs MAT noted at outside hospital and in am 02/12  --Echo showed EF 35% and stage II diastolic dysfunction   --Cannot tolerate anticoagulation at this time with GIB, not restating asp on d/c

## 2019-02-14 NOTE — ASSESSMENT & PLAN NOTE
--Etiology likely MAT vs paroxysmal afib with RVR???  --arrhythmia noted multiple times this admission, and just prior to discharge. Given Metoprolol IV for rate control and discharged with metoprolol 25mg po bid.   --Echo showed EF 35% and stage II diastolic dysfunction   --Cannot tolerate anticoagulation at this time with GIB, not restating asp on d/c due to bleeding risk

## 2019-02-14 NOTE — SUBJECTIVE & OBJECTIVE
Interval History/Significant Events: no acute events overnight, considering home hospice depending on results of family meeting this afternoon    Review of Systems   Constitutional: Negative for chills and fever.   HENT: Negative for sore throat and trouble swallowing.    Eyes: Negative for discharge and visual disturbance.   Respiratory: Negative for cough and shortness of breath.    Cardiovascular: Negative for chest pain and palpitations.   Gastrointestinal: Negative for abdominal distention and abdominal pain.   Endocrine: Negative for polydipsia and polyuria.   Genitourinary: Negative for frequency and hematuria.   Musculoskeletal: Negative for joint swelling and myalgias.   Skin: Negative for color change and rash.   Neurological: Negative for dizziness and headaches.   Hematological: Negative for adenopathy. Does not bruise/bleed easily.     Objective:     Vital Signs (Most Recent):  Temp: 97.5 °F (36.4 °C) (02/14/19 1100)  Pulse: 69 (02/14/19 1515)  Resp: 20 (02/14/19 1515)  BP: (!) 105/59 (02/14/19 1515)  SpO2: 100 % (02/14/19 1515) Vital Signs (24h Range):  Temp:  [97.5 °F (36.4 °C)-97.7 °F (36.5 °C)] 97.5 °F (36.4 °C)  Pulse:  [] 69  Resp:  [12-36] 20  SpO2:  [87 %-100 %] 100 %  BP: ()/(42-61) 105/59   Weight: 56.6 kg (124 lb 12.5 oz)  Body mass index is 19.54 kg/m².      Intake/Output Summary (Last 24 hours) at 2/14/2019 1531  Last data filed at 2/14/2019 1100  Gross per 24 hour   Intake 310 ml   Output 1270 ml   Net -960 ml       Physical Exam   Constitutional: He appears well-developed. He does not have a sickly appearance. No distress.   HENT:   Head: Normocephalic and atraumatic.   Eyes: Conjunctivae are normal. Pupils are equal, round, and reactive to light. Right eye exhibits no exudate. Left eye exhibits no exudate. Right conjunctiva has no hemorrhage. Left conjunctiva has no hemorrhage. No scleral icterus. Right eye exhibits no nystagmus. Left eye exhibits no nystagmus.   Neck:  Trachea normal. No neck rigidity. No tracheal deviation present.   Cardiovascular: Normal rate, regular rhythm and normal heart sounds. PMI is not displaced. Exam reveals no gallop and no friction rub.   No murmur heard.  Pulses:       Radial pulses are 2+ on the right side, and 2+ on the left side.        Dorsalis pedis pulses are 2+ on the right side, and 2+ on the left side.   Periodic episodes of MAT noted, however on exam this am, he is in NSR   Pulmonary/Chest: Effort normal and breath sounds normal. No accessory muscle usage. No respiratory distress. He has no wheezes. He has no rhonchi. He has no rales.   Abdominal: Soft. Normal appearance and bowel sounds are normal. There is no tenderness.   Musculoskeletal: Normal range of motion.   Neurological: He is alert. No cranial nerve deficit or sensory deficit. GCS eye subscore is 4. GCS verbal subscore is 1. GCS motor subscore is 6.   Alert and following commands, no neuro deficits appreciated.    Skin: Skin is warm and dry. Capillary refill takes 2 to 3 seconds. He is not diaphoretic. No cyanosis. Nails show no clubbing.   Nursing note and vitals reviewed.      Vents:  Vent Mode: Spont (02/12/19 0843)  Ventilator Initiated: Yes (02/11/19 1439)  Set Rate: 16 bmp (02/12/19 0528)  Vt Set: 375 mL (02/12/19 0528)  Pressure Support: 0 cmH20 (02/12/19 0843)  PEEP/CPAP: 5 cmH20 (02/12/19 0843)  Oxygen Concentration (%): 100 (02/14/19 1100)  Peak Airway Pressure: 7.1 cmH2O (02/12/19 0843)  Plateau Pressure: 0 cmH20 (02/12/19 0843)  Total Ve: 12.3 mL (02/12/19 0843)  Negative Inspiratory Force (cm H2O): -36 (02/12/19 0843)  F/VT Ratio<105 (RSBI): 113.45 (02/12/19 0843)  Lines/Drains/Airways     Pressure Ulcer                 Pressure Injury 02/11/19 0230 Sacral spine Stage 1 3 days              Significant Labs:    CBC/Anemia Profile:  Recent Labs   Lab 02/13/19  0227 02/13/19  1716 02/14/19  0500   WBC 5.65 5.43 5.30   HGB 8.5* 8.0* 8.6*   HCT 27.1* 24.2* 27.0*   PLT  140* 145* 171   MCV 93 92 93   RDW 15.7* 16.0* 15.4*        Chemistries:  Recent Labs   Lab 02/13/19  0227 02/13/19  1359 02/13/19  1509 02/14/19  0500     140 135*  --  136   K 3.6  3.6 6.6* 4.2 4.1     103 102  --  104   CO2 29  29 27  --  28   BUN 31*  31* 23  --  20   CREATININE 0.9  0.9 0.8  --  0.8   CALCIUM 9.1  9.1 9.3  --  9.2   MG 2.1  --  2.1 2.0   PHOS 2.8  --   --  2.5*       All pertinent labs within the past 24 hours have been reviewed.    Significant Imaging:  I have reviewed all pertinent imaging results/findings within the past 24 hours.

## 2019-02-14 NOTE — PROGRESS NOTES
Pt HR in the 150's, BP stable. CCT notified and to the bedside. Orders for EKG, Metoprolol, and BMP noted. WCTM.

## 2019-02-14 NOTE — DISCHARGE SUMMARY
"[facility]  Critical Care Medicine  Discharge Summary      Patient Name: Cash Rivera  MRN: [id number]  Admission Date: 2/11/2019  Hospital Length of Stay: 3 days  Discharge Date and Time:  02/14/2019 3:32 PM  Attending Physician: No att. 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 Patient reports that his initial symptoms included syncope and "seeing black" while he was using chewing tobacco on 2/9/18. The event was witnessed by a family member who noted about 1 minute of turning his head to the left and shaking all over" per ED reports. At that time he also reports mild chest discomfort to include substernal CP and dysnpea that have lasted subacutely for 3-4 weeks. His initial Hb was 11.4 on 2/9, and during his stay at [facility], he developed hematemesis, symptomatic anemia and had a possible aspiration event and 2 point drop in Hb. He was placed on nasogastric suction with coffee ground aspirate, and was given PPI therapy and [place] [facility] [place] with Neosyn at the OSH, then Levophed and amiodarone infusing for AF RVR. Upon arrival the patient had stable vital signs, sinus bradycardia, MAP of 70. He reports taking intermittent NSAIDs and Judith Gallatin for stomach pain. He reports only taking ASA81 for CAD given remote revascularization history with 4 stents per charting. He was being treated for aspiration pneumonia vs bibasilar infiltrates with Levaquin at the time of transfer, and was scheduled for outside hospital discharge until hematemesis developed. He is a DNR code status.     Procedure(s) " (LRB):  EGD (ESOPHAGOGASTRODUODENOSCOPY) (N/A)    Indwelling Lines/Drains at Time of Discharge:   Lines/Drains/Airways     Pressure Ulcer                 Pressure Injury 02/11/19 0230 Sacral spine Stage 1 3 days              Hospital Course:   Following admission to MICU on 02/11, an EGD was conducted by GI who found multiple blood clots with concern for mass, no active bleeding, biopsy taken.  Will keep intubated overnight per GI request. Hypotension noted post-procedure, so levophed initiated and stat CBC which showed no decrease in hgb.  Will start intermittent fentanyl pushes with propofol for sedation given plans for possible extubation tomorrow. He was extubated on the morning of 02/12, to room air with prn nasal cannula. Antibiotics discontinued as low indications for pna, lasix ordered to remove excess fluid. Met with patient and his daughter and granddaughter on 2/13 to discuss goals of care. Mr. Rivera has made it his wish that no further investigation of his EGD results beyond the biopsy which is still pending. Plans for discharge with home hospice have been arranged for his discharge on 2/14.        Consults (From admission, onward)        Status Ordering Provider     Inpatient consult to Gastroenterology  Once     Provider:  (Not yet assigned)    Completed FANY LEON     Inpatient consult to Social Work  Once     Provider:  (Not yet assigned)    Acknowledged FANY LEON        Significant Labs:  All pertinent labs within the past 24 hours have been reviewed.    Significant Imaging:  I have reviewed all pertinent imaging results/findings within the past 24 hours.    Pending Diagnostic Studies:     Procedure Component Value Units Date/Time    CBC auto differential [[id number]] Collected:  02/11/19 1725    Order Status:  Sent Lab Status:  In process Updated:  02/11/19 1725    Specimen:  Blood     EKG 12-lead [[id number]]     Order Status:  Sent Lab Status:  No result         Final  Active Diagnoses:    Diagnosis Date Noted POA    PRINCIPAL PROBLEM:  GI bleed [K92.2] 02/10/2019 Yes    Acute on chronic combined systolic and diastolic heart failure [I50.43] 02/12/2019 Unknown    CAD (coronary artery disease) [I25.10] 02/11/2019 Yes    Gout [M10.9] 02/11/2019 Yes    Syncope [R55] 02/11/2019 Yes    Essential hypertension [I10] 02/11/2019 Yes    Bilateral pulmonary infiltrates on chest x-ray [R91.8] 02/11/2019 Yes    Multifocal atrial tachycardia [I47.1] 02/11/2019 Yes    Retained urethral stent [Z96.0] 02/11/2019 Not Applicable      Problems Resolved During this Admission:    Diagnosis Date Noted Date Resolved POA    QT prolongation [R94.31] 02/11/2019 02/14/2019 Yes     Pulmonary   Bilateral pulmonary infiltrates on chest x-ray    -etiology possibly chronic parenchymal lung disease, less likely new aspiration event  --d/c abx     Cardiac/Vascular   Acute on chronic combined systolic and diastolic heart failure    --reduced EF 35%  --continue to diurese with lasix for excess fluid removal     Multifocal atrial tachycardia    --vs A- fib RVR   --patient arrived on amiodarone gtt, d/c'd  --Sinus bradycardia noted on arrival.   --currently in NSR 70  --discharging with metoprolol 25mg BID     Essential hypertension    --resume PO beta blocker therapy and continue at home     CAD (coronary artery disease)    --remote history of stenting, resume atorvastatin,   --change home atenolol to metoprolol 25mg BID   --do not restart aspirin secondary to GI bleed     Renal/   Retained urethral stent    --voids without issue     GI   * GI bleed    --EGD with GI showed source of bleeding is a mass (likely adenocarcinoma- pathology pending).   --continue PPI 40mg PO BID         Orthopedic   Gout    -on allopurinol daily, resume on discharge      Other   Syncope    --Etiology likely MAT vs paroxysmal afib with RVR???  --arrhythmia noted multiple times this admission, and just prior to discharge. Given  Metoprolol IV for rate control and discharged with metoprolol 25mg po bid.   --Echo showed EF 35% and stage II diastolic dysfunction   --Cannot tolerate anticoagulation at this time with GIB, not restating asp on d/c due to bleeding risk         Discharged Condition: fair    Disposition: Hospice/Home      Patient Instructions:      Diet Cardiac     Activity as tolerated     Medications:  Reconciled Home Medications:      Medication List      START taking these medications    * metoprolol tartrate 25 MG tablet  Commonly known as:  LOPRESSOR  Take 1 tablet (25 mg total) by mouth 2 (two) times daily.          * pantoprazole 40 MG tablet  Commonly known as:  PROTONIX  Take 1 tablet (40 mg total) by mouth 2 (two) times daily before meals.              * This list has 4 medication(s) that are the same as other medications prescribed for you. Read the directions carefully, and ask your doctor or other care provider to review them with you.            CONTINUE taking these medications    allopurinol 300 MG tablet  Commonly known as:  ZYLOPRIM  Take 300 mg by mouth 2 (two) times daily.     atorvastatin 40 MG tablet  Commonly known as:  LIPITOR  Take 40 mg by mouth once daily.        STOP taking these medications    aspirin 81 MG EC tablet  Commonly known as:  ECOTRIN     atenolol 50 MG tablet  Commonly known as:  TENORMIN             Jose M Snow NP  Critical Care Medicine  [facility]

## 2019-02-16 LAB
BACTERIA BLD CULT: NORMAL
BACTERIA BLD CULT: NORMAL

## 2019-02-18 NOTE — PLAN OF CARE
Follow-up With  Details  Why  Contact Info   [facility]          02/18/19 0929   Final Note   Assessment Type Final Discharge Note   Anticipated Discharge Disposition [facility] Follow Up  Appt(s) scheduled? No   Discharge plans and expectations educations in teach back method with documentation complete? Yes   Right Care Referral Info   Post Acute Recommendation Home-care   Referral Type Home Hospice   Facility Name [facility]   Marcie Read RN, BSN, CCM  Case Management  [facility]  Ext. [phone]

## 2019-02-19 NOTE — PHYSICIAN QUERY
PT Name: Cash Rivera  MR #: [id number]     PHYSICIAN QUERY -  ACUITY OF CONDITION CLARIFICATION      CDS Pauly Bhardwaj RN, BSN        Contact Information:  [phone]    [email or web address]         This form is a permanent document in the medical record.     Query Date: February 19, 2019    By submitting this query, we are merely seeking further clarification of documentation to reflect the severity of illness of your patient. Please utilize your independent clinical judgment when addressing the question(s) below.    The Medical record reflects the following:     Indicators   Supporting Clinical Findings Location in Medical Record   X   Documentation of condition UGIB H&P    Lab Value(s)    2/11 2/10 2/11 2/12 2/12 2/13 2/13 2/13 2/14   Hemoglobin 7.6 (L) 8.3 (L) 8.5 (L) 8.2 (L) 9.2 (L) 8.7 (L) 8.5 (L) 8.0 (L) 8.6 (L)   Hematocrit 24.2 (L) 25.8 (L) 25.7 (L) 26.5 (L) 28.6 (L) 27.7 (L) 27.1 (L) 24.2 (L) 27.0 (L)    CBC     Radiology Findings     X   Treatment                                 Medication received PPI load in [place], continue PPI 40mg IV BID H&P   X   Other Impression:             - Normal esophagus.  - Large hiatal hernia.  - Non-obstructing non-bleeding gastric ulcer with a      flat pigmented spot (Willi Class IIc). Biopsied.      This is suspicious for malignancy.   - Clotted blood in the gastric body.   - No gross lesions in the entire examined    duodenum. 2/11 EGD      Provider, please specify the acuity/chronicity of     Gastric  ulcer     [X ] Acute   [   ] Chronic   [   ] Acute and/on chronic   [   ]  Clinically Undetermined       Please document in your progress notes daily for the duration of treatment until resolved, and include in your discharge summary.

## 2019-02-21 NOTE — PHYSICIAN QUERY
PT Name: Cash Rivera  MR #: [id number]    Physician Query Form - Pathology Findings Clarification     CDS Pauly Bhardwaj RN, BSN        Contact Information:  [phone]    [email or web address]         This form is a permanent document in the medical record.     Query Date: February 21, 2019      By submitting this query, we are merely seeking further clarification of documentation.  Please utilize your independent clinical judgment when addressing the question(s) below.      The medical record contains the following:     Findings Supporting Clinical Information Location in Medical Record   FINAL PATHOLOGIC DIAGNOSIS  1. Gastric cardia mass, biopsy:  - Schwartz's esophagus with focal low-grade dysplasia  - Gastric cardia mucosa with ulceration, granulation tissue formation and associated fibrinopurulent exudate  Comment: The specimen shows specialized columnar epithelium with goblet cell metaplasia with focal areas of low grade dysplasia , consistent with Schwartz's esophagus. Associated gastric exhibits features of chronic gastric moderate carditis with activity and mucosal ulceration. Multiple levels were examined and no evidence malignancy was appreciated. Clinical and endoscopic correlation is necessary. If the lesion is clinically worrisome, repeat biopsy is strongly recommended.               acute symptomatic upper GI bleeding        an EGD was conducted by GI who found multiple blood clots with concern for mass, no active bleeding     Pathology report final result 2/20      Please document the clinical significance of the Pathologists findings of     - Schwartz's esophagus with focal low-grade dysplasia  - Gastric cardia mucosa with ulceration, granulation tissue formation and associated fibrinopurulent exudate  Associated gastric exhibits features of chronic gastric moderate carditis with activity and mucosal ulceration.     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

## 2019-02-28 NOTE — PHYSICIAN QUERY
PT Name: Cash Rivera  MR #: [id number]     Physician Query Form - Diagnosis Clarification      CDS Pauly Bhardwaj RN, BSN        Contact Information:  [phone]    [email or web address]           This form is a permanent document in the medical record.     Query Date: February 28, 2019    By submitting this query, we are merely seeking further clarification of documentation.  Please utilize your independent clinical judgment when addressing the question(s) below.     The medical record contains the following:      Findings Supporting Clinical Information Location in Medical Record   Cachectic           Body mass index is 19.58   He appears well-developed    BMI 21         Following admission to MICU on 02/11, an EGD was conducted by GI who found multiple blood clots with concern for mass, no active bleeding, biopsy taken.  Will keep intubated overnight per GI request. Hypotension noted post-procedure, so levophed initiated and stat CBC which showed no decrease in hgb.  Will start intermittent fentanyl pushes with propofol for sedation given plans for possible extubation tomorrow. He was extubated on the morning of 02/12, to room air with prn nasal cannula. Antibiotics discontinued as low indications for pna, lasix ordered to remove excess fluid. Met with patient and his daughter and granddaughter on 2/13 to discuss goals of care. Mr. Rivera has made it his wish that no further investigation of his EGD results beyond the biopsy which is still pending. Plans for discharge with home hospice have been arranged for his discharge on 2/14.       H&P    2/12 Critical Care Medicine filed 7:57pm     2/11 per bed scale weight     Discharge summary      Please clarify if the   Cachectic     diagnosis has been:    [  x] Ruled In   [  ] Ruled Out   [  ] Other/Clarification of findings (please specify):     [  ] Clinically undetermined     Please document in your progress notes daily for the duration of treatment, until  resolved, and include in your discharge summary.

## 2019-02-28 NOTE — PHYSICIAN QUERY
"PT Name: Cash Rivera  MR #: [id number]    Physician Query Form - Hematology Clarification      CDS Pauly Bhardwaj RN, BSN        Contact Information:  [phone]    [email or web address]           This form is a permanent document in the medical record.      Query Date: February 28, 2019    By submitting this query, we are merely seeking further clarification of documentation. Please utilize your independent clinical judgment when addressing the question(s) below.    The Medical record contains the following:   Indicators  Supporting Clinical Findings Location in Medical Record   X   "Anemia" documented symptomatic anemia      H&P, PNs, Discharge summary    X   H & H = 8.4/26/1 --> 7.6/24.2 --> 8.2/26.5--> 8.0/24.2 --> 8.6/27.0 CBC 2/11 ---> 2/14    X   BP =                     HR= BP: (81 - 128) / (43 - 68)   HR: 56 - 165  Flow sheets   X   "GI bleeding" documented GI bleed H&P    Acute bleeding (Non GI site)      Transfusion(s)     X   Treatment: Impression:             - Normal esophagus.  - Large hiatal hernia.  - Non-obstructing non-bleeding gastric ulcer with a         flat pigmented spot (Willi Class IIc). Biopsied.         This is suspicious for malignancy.  - Clotted blood in the gastric body.  - No gross lesions in the entire examined duodenum. 2/11 EGD    X   Other:  Syncope H&P     Provider, please specify diagnosis or diagnoses associated with above clinical findings.    [ x ] Acute blood loss anemia   [  ] Chronic blood loss anemia     [  ] Other Hematological Diagnosis (please specify):     [  ] Clinically Undetermined       Please document in your progress notes daily for the duration of treatment, until resolved, and include in your discharge summary.                                                                                                      "

## 2019-02-28 NOTE — PHYSICIAN QUERY
PT Name: Cash Rivera  MR #: [id number]     Physician Query Form - Documentation Clarification      CDS Pauly Bhardwaj RN, BSN        Contact Information:  [phone]    [email or web address]           This form is a permanent document in the medical record.     Query Date: February 28, 2019    By submitting this query, we are merely seeking further clarification of documentation. Please utilize your independent clinical judgment when addressing the question(s) below.    The Medical record reflects the following:    Supporting Clinical Findings Location in Medical Record   Sacral spine Stage 1      Pressure Injury      02/11/19 0230  2/12 Critical Care Medicine PN filed @ 7:57pm   Lines/Drains/Airways                                                                             Doctor, Please specify if you agree with the  diagnosis  Sacral Pressure Ulcer Stage 1  associated with above clinical findings.        ____x_     Yes,  I agree with the diagnosis of Sacral Pressure Ulcer Stage 1     _____     No, I do not agree with the diagnosis of Sacral Pressure Ulcer Stage 1    _____     Other please specify: _______________________________________                                                                                                               [  ] Clinically Undetermined

## 2023-11-15 NOTE — PLAN OF CARE
Platelet count 49k today, continue Eliquis. Will return on Thursday 11/16, if platelet count <30k HOLD A/C.   Problem: Adult Inpatient Plan of Care  Goal: Patient-Specific Goal (Individualization)  2/11:    Transferred from OSH for syncope, GI bleed. (2units PRBS, 1 unit FFP given at OSH)               GI consulted for possible EGD.               EGD performed. Large ulcer biopsied for possible carcinoma. Pt. Intubated and sedated for 24 hr Reglan therapy.              Propfol and Levo started.           Comments: 2/11:    Transferred from OSH for syncope, GI bleed. (2units PRBS, 1 unit FFP given at OSH)               GI consulted for possible EGD.               EGD performed. Large ulcer biopsied for possible carcinoma. Pt. Intubated and sedated for 24 hr Reglan therapy.              Propfol and Levo started.